# Patient Record
Sex: MALE | Employment: FULL TIME | URBAN - METROPOLITAN AREA
[De-identification: names, ages, dates, MRNs, and addresses within clinical notes are randomized per-mention and may not be internally consistent; named-entity substitution may affect disease eponyms.]

---

## 2018-01-28 ENCOUNTER — RADIANT APPOINTMENT (OUTPATIENT)
Dept: GENERAL RADIOLOGY | Facility: CLINIC | Age: 38
End: 2018-01-28
Attending: FAMILY MEDICINE
Payer: COMMERCIAL

## 2018-01-28 ENCOUNTER — OFFICE VISIT (OUTPATIENT)
Dept: URGENT CARE | Facility: URGENT CARE | Age: 38
End: 2018-01-28
Payer: COMMERCIAL

## 2018-01-28 VITALS
SYSTOLIC BLOOD PRESSURE: 130 MMHG | DIASTOLIC BLOOD PRESSURE: 100 MMHG | HEART RATE: 105 BPM | TEMPERATURE: 98.6 F | OXYGEN SATURATION: 98 %

## 2018-01-28 DIAGNOSIS — S99.911A ANKLE INJURY, RIGHT, INITIAL ENCOUNTER: ICD-10-CM

## 2018-01-28 DIAGNOSIS — S99.911A ANKLE INJURY, RIGHT, INITIAL ENCOUNTER: Primary | ICD-10-CM

## 2018-01-28 PROCEDURE — 99213 OFFICE O/P EST LOW 20 MIN: CPT | Performed by: FAMILY MEDICINE

## 2018-01-28 PROCEDURE — 73630 X-RAY EXAM OF FOOT: CPT | Mod: RT

## 2018-01-28 PROCEDURE — 73610 X-RAY EXAM OF ANKLE: CPT | Mod: RT

## 2018-01-28 RX ORDER — HYDROCODONE BITARTRATE AND ACETAMINOPHEN 5; 325 MG/1; MG/1
1 TABLET ORAL EVERY 6 HOURS PRN
Qty: 10 TABLET | Refills: 0 | Status: SHIPPED | OUTPATIENT
Start: 2018-01-28 | End: 2018-02-22

## 2018-01-28 NOTE — PROGRESS NOTES
SUBJECTIVE:  Kaleb Benites, a 37 year old male scheduled an appointment to discuss the following issues:  Ankle injury, right, initial encounter;     Complaining of right ankle pain while putting his boot on Thursday.  Complaining of some swelling unable to put pressure on the ankle.  When he inverts it or everts that he has some pain      0: He appears well, vital signs are normal. There is swelling and tenderness over the lateral malleolus. No tenderness over the medial aspect of the ankle. The fifth metatarsal is not tender. The ankle joint is intact without excessive opening on stressing. X-Ray shows fracture to be negative. The rest of the foot, ankle and leg exam is normal.    X-ray without evidence of fracture    A: Sprain of ankle    P: Rest and elevate the injured ankle, apply ice .    In addition would use a boot for support that he will wean off of over the next week.    Nonsteroidal medication.  Norco quantity of 10 no refills.

## 2018-01-28 NOTE — NURSING NOTE
"Chief Complaint   Patient presents with     Urgent Care     Trauma     Injury to R ankle putting boot on Thursday night- swollen, unable to put pressure on, unable to turm ankle       Initial BP (!) 130/100 (BP Location: Right arm, Patient Position: Chair, Cuff Size: Adult Large)  Pulse 105  Temp 98.6  F (37  C) (Oral)  SpO2 98% Estimated body mass index is 32.59 kg/(m^2) as calculated from the following:    Height as of 7/1/16: 6' 1\" (1.854 m).    Weight as of 7/1/16: 247 lb (112 kg).  Medication Reconciliation: complete     Carol Puga CMA (AAMA)        "

## 2018-01-28 NOTE — MR AVS SNAPSHOT
"              After Visit Summary   2018    Kaleb Benites    MRN: [id number]           Patient Information     Date Of Birth          1980        Visit Information        Provider Department      2018 4:05 PM Amrit Haley MD [facility]        Today's Diagnoses     Ankle injury, right, initial encounter    -  1      Care Instructions    1. Ice            Follow-ups after your visit        Who to contact     If you have questions or need follow up information about today's clinic visit or your schedule please contact [facility] directly at [phone].  Normal or non-critical lab and imaging results will be communicated to you by [employer], letter or phone within 4 business days after the clinic has received the results. If you do not hear from us within 7 days, please contact the clinic through [employer] or phone. If you have a critical or abnormal lab result, we will notify you by phone as soon as possible.  Submit refill requests through Comviva or call your pharmacy and they will forward the refill request to us. Please allow 3 business days for your refill to be completed.          Additional Information About Your Visit        MyChart Information     Comviva lets you send messages to your doctor, view your test results, renew your prescriptions, schedule appointments and more. To sign up, go to [email or web address] . Click on \"Log in\" on the left side of the screen, which will take you to the Welcome page. Then click on \"Sign up Now\" on the right side of the page.     You will be asked to enter the access code listed below, as well as some personal information. Please follow the directions to create your username and password.     Your access code is: [email or web address]  Expires: 2018  5:23 PM     Your access code will  in 90 days. If you need help or a new code, please call your [facility] clinic or [phone].        Care EveryWhere ID     " This is your Care EveryWhere ID. This could be used by other organizations to access your [place] medical records  [id number]        Your Vitals Were     Pulse Temperature Pulse Oximetry             105 98.6  F (37  C) (Oral) 98%          Blood Pressure from Last 3 Encounters:   01/28/18 (!) 130/100   07/01/16 130/84   04/23/15 110/90    Weight from Last 3 Encounters:   07/01/16 247 lb (112 kg)   04/23/15 247 lb (112 kg)   05/12/14 248 lb (112.5 kg)                 Today's Medication Changes          These changes are accurate as of 1/28/18  5:23 PM.  If you have any questions, ask your nurse or doctor.               Start taking these medicines.        Dose/Directions    HYDROcodone-acetaminophen 5-325 MG per tablet   Commonly known as:  NORCO   Used for:  Ankle injury, right, initial encounter   Started by:  Amrit Haley MD        Dose:  1 tablet   Take 1 tablet by mouth every 6 hours as needed   Quantity:  10 tablet   Refills:  0       nabumetone 750 MG tablet   Commonly known as:  RELAFEN   Used for:  Ankle injury, right, initial encounter   Started by:  Amrit Haley MD        Dose:  750 mg   Take 1 tablet (750 mg) by mouth 2 times daily as needed for moderate pain   Quantity:  30 tablet   Refills:  1       order for DME   Used for:  Ankle injury, right, initial encounter   Started by:  Amrit Haley MD        Equipment being ordered: walking boot   Quantity:  1 Units   Refills:  0            Where to get your medicines      These medications were sent to [id number] [address] [address]     Phone:  [phone]     nabumetone 750 MG tablet         Some of these will need a paper prescription and others can be bought over the counter.  Ask your nurse if you have questions.     Bring a paper prescription for each of these medications     HYDROcodone-acetaminophen 5-325 MG per tablet    order for DME                Primary  Care Provider    Danilo Aguirre MD       No address on file        Equal Access to Services     PARKER MARSHALL : Hadii aad ku aminah Soosmany, waaxda luqadaha, qaybta kawindy eduardlelandchloé, waxsydnee cristo giorgichinedu bonilladorivamsi meredithnasrachinedu rivers. So [facility] [phone].    ATENCIÓN: Si habla español, tiene a cuellar disposición servicios gratuitos de asistencia lingüística. [facility] [phone].    We comply with applicable federal civil rights laws and [place] laws. We do not discriminate on the basis of race, color, national origin, age, disability, sex, sexual orientation, or gender identity.            Thank you!     Thank you for choosing [facility]  for your care. Our goal is always to provide you with excellent care. Hearing back from our patients is one way we can continue to improve our services. Please take a few minutes to complete the written survey that you may receive in the mail after your visit with us. Thank you!             Your Updated Medication List - Protect others around you: Learn how to safely use, store and throw away your medicines at www.disposemymeds.org.          This list is accurate as of 1/28/18  5:23 PM.  Always use your most recent med list.                   Brand Name Dispense Instructions for use Diagnosis    cyclobenzaprine 5 MG tablet    FLEXERIL    30 tablet    Take 1-2 tablets (5-10 mg) by mouth 3 times daily as needed for muscle spasms    Back muscle spasm       diclofenac 50 MG EC tablet    VOLTAREN    30 tablet    Take 1 tablet (50 mg) by mouth 3 times daily With food    Acute right-sided low back pain without sciatica, Back muscle spasm       HYDROcodone-acetaminophen 5-325 MG per tablet    NORCO    10 tablet    Take 1 tablet by mouth every 6 hours as needed    Ankle injury, right, initial encounter       nabumetone 750 MG tablet    RELAFEN    30 tablet    Take 1 tablet (750 mg) by mouth 2 times daily as needed for moderate pain    Ankle injury, right, initial  encounter       order for DME     1 Units    Equipment being ordered: walking boot    Ankle injury, right, initial encounter

## 2018-02-22 ENCOUNTER — OFFICE VISIT (OUTPATIENT)
Dept: INTERNAL MEDICINE | Facility: CLINIC | Age: 38
End: 2018-02-22
Payer: COMMERCIAL

## 2018-02-22 VITALS
OXYGEN SATURATION: 97 % | TEMPERATURE: 98.2 F | HEART RATE: 87 BPM | BODY MASS INDEX: 34.33 KG/M2 | HEIGHT: 73 IN | SYSTOLIC BLOOD PRESSURE: 130 MMHG | DIASTOLIC BLOOD PRESSURE: 78 MMHG | WEIGHT: 259 LBS

## 2018-02-22 DIAGNOSIS — S93.401D SPRAIN OF RIGHT ANKLE, UNSPECIFIED LIGAMENT, SUBSEQUENT ENCOUNTER: ICD-10-CM

## 2018-02-22 DIAGNOSIS — M25.571 ACUTE RIGHT ANKLE PAIN: Primary | ICD-10-CM

## 2018-02-22 PROCEDURE — 36415 COLL VENOUS BLD VENIPUNCTURE: CPT | Performed by: FAMILY MEDICINE

## 2018-02-22 PROCEDURE — 84550 ASSAY OF BLOOD/URIC ACID: CPT | Performed by: FAMILY MEDICINE

## 2018-02-22 PROCEDURE — 99213 OFFICE O/P EST LOW 20 MIN: CPT | Performed by: FAMILY MEDICINE

## 2018-02-22 RX ORDER — INDOMETHACIN 50 MG/1
50 CAPSULE ORAL
Qty: 30 CAPSULE | Refills: 1 | Status: SHIPPED | OUTPATIENT
Start: 2018-02-22 | End: 2022-03-15

## 2018-02-22 NOTE — MR AVS SNAPSHOT
After Visit Summary   2/22/2018    Kaleb Benites    MRN: [id number]           Patient Information     Date Of Birth          1980        Visit Information        Provider Department      2/22/2018 8:40 AM Ander Nieves MD [facility]        Today's Diagnoses     Acute right ankle pain    -  1      Care Instructions      Take prescribed medication as directed.    Brace.    Light activity.          Follow-ups after your visit        Additional Services     ORTHOPEDICS ADULT REFERRAL       Your provider has referred you to: [facility]: [facility]  [phone]   [email or web address] [email or web address]    Please be aware that coverage of these services is subject to the terms and limitations of your health insurance plan.  Call member services at your health plan with any benefit or coverage questions.      Please bring the following to your appointment:    >>   Any x-rays, CTs or MRIs which have been performed.  Contact the facility where they were done to arrange for  prior to your scheduled appointment.    >>   List of current medications   >>   This referral request   >>   Any documents/labs given to you for this referral                  Who to contact     If you have questions or need follow up information about today's clinic visit or your schedule please contact [facility] directly at [phone].  Normal or non-critical lab and imaging results will be communicated to you by MyChart, letter or phone within 4 business days after the clinic has received the results. If you do not hear from us within 7 days, please contact the clinic through MyChart or phone. If you have a critical or abnormal lab result, we will notify you by phone as soon as possible.  Submit refill requests through YAMAP or call your pharmacy and they will forward the  "refill request to us. Please allow 3 business days for your refill to be completed.          Additional Information About Your Visit        QuickGiftsharWego Information     iStoryTime lets you send messages to your doctor, view your test results, renew your prescriptions, schedule appointments and more. To sign up, go to [email or web address] . Click on \"Log in\" on the left side of the screen, which will take you to the Welcome page. Then click on \"Sign up Now\" on the right side of the page.     You will be asked to enter the access code listed below, as well as some personal information. Please follow the directions to create your username and password.     Your access code is: [email or web address]  Expires: 2018  5:23 PM     Your access code will  in 90 days. If you need help or a new code, please call your [facility] clinic or [phone].        Care EveryWhere ID     This is your Care EveryWhere ID. This could be used by other organizations to access your [place] medical records  [id number]        Your Vitals Were     Pulse Temperature Height Pulse Oximetry BMI (Body Mass Index)       87 98.2  F (36.8  C) (Oral) 6' 1\" (1.854 m) 97% 34.17 kg/m2        Blood Pressure from Last 3 Encounters:   18 130/78   18 (!) 130/100   16 130/84    Weight from Last 3 Encounters:   18 259 lb (117.5 kg)   16 247 lb (112 kg)   04/23/15 247 lb (112 kg)              We Performed the Following     ORTHOPEDICS ADULT REFERRAL     Uric acid          Today's Medication Changes          These changes are accurate as of 18  9:22 AM.  If you have any questions, ask your nurse or doctor.               Start taking these medicines.        Dose/Directions    indomethacin 50 MG capsule   Commonly known as:  INDOCIN   Used for:  Acute right ankle pain   Started by:  Ander Nieves MD        Dose:  50 mg   Take 1 capsule (50 mg) by mouth 3 times daily (with meals) Take with food.   Quantity:  30 capsule "   Refills:  1            Where to get your medicines      These medications were sent to [id number] - [facility] [address] [place]  [address] [facility] [address]     Phone:  [phone]     indomethacin 50 MG capsule                Primary Care Provider    None Specified       No primary provider on file.        Equal Access to Services     [facility] MARSHALL : Hadii aad ku hadasho Soomaali, waaxda luqadaha, qaybta kaalmada adeegyada, waxay cristo bonilladorivamsi rivers. So [facility] [phone].    ATENCIÓN: Si habla español, tiene a cuellar disposición servicios gratuitos de asistencia lingüística. Gunnar al [phone].    We comply with applicable federal civil rights laws and [place] laws. We do not discriminate on the basis of race, color, national origin, age, disability, sex, sexual orientation, or gender identity.            Thank you!     Thank you for choosing [facility]  for your care. Our goal is always to provide you with excellent care. Hearing back from our patients is one way we can continue to improve our services. Please take a few minutes to complete the written survey that you may receive in the mail after your visit with us. Thank you!             Your Updated Medication List - Protect others around you: Learn how to safely use, store and throw away your medicines at www.disposemymeds.org.          This list is accurate as of 2/22/18  9:22 AM.  Always use your most recent med list.                   Brand Name Dispense Instructions for use Diagnosis    indomethacin 50 MG capsule    INDOCIN    30 capsule    Take 1 capsule (50 mg) by mouth 3 times daily (with meals) Take with food.    Acute right ankle pain       order for DME     1 Units    Equipment being ordered: walking boot    Ankle injury, right, initial encounter

## 2018-02-22 NOTE — PROGRESS NOTES
"CHIEF COMPLAINT    R ankle pain      HISTORY    He twisted R ankle about 4 weeks ago. Twisted while he was putting on a boot. He had pain and swelling. He went to  and x rays neg. He was given a boot. Boot seemed to help but bothered his gait and back.    He still c/o pain with activity and swelling.    He is concerned because he has had to be at  about 4 times in last 2 yr due to R ankle injuries. He wonders about why these recur.    Occupation - desk work.    Pos FH of gout in [place], Mother, GF.    Patient Active Problem List   Diagnosis     Health Care Home     Living will, counseling/discussion     Current Outpatient Prescriptions   Medication Sig Dispense Refill     indomethacin (INDOCIN) 50 MG capsule Take 1 capsule (50 mg) by mouth 3 times daily (with meals) Take with food. 30 capsule 1     order for DME Equipment being ordered: walking boot 1 Units 0       REVIEW OF SYSTEMS    Unremarkable except as above.      SOCIAL HISTORY    Desk work - property management.      No past medical history on file.      EXAM  /78  Pulse 87  Temp 98.2  F (36.8  C) (Oral)  Ht 6' 1\" (1.854 m)  Wt 259 lb (117.5 kg)  SpO2 97%  BMI 34.17 kg/m2    R leg:  No edema  Moderate ankle swelling.  No significant tenderness medially or laterally.    X rays reviewed - no FX.      (M25.571) Acute right ankle pain  (primary encounter diagnosis)  Comment:   Recurrent injuries per HX.  R/O gout  Consult.  Plan: Uric acid, ORTHOPEDICS ADULT REFERRAL,         indomethacin (INDOCIN) 50 MG capsule            (S93.401D) Sprain of right ankle, unspecified ligament, subsequent encounter  Comment:   Plan: ORTHOPEDICS ADULT REFERRAL, indomethacin         (INDOCIN) 50 MG capsule                Take prescribed medication as directed.    Brace.    Light activity.                "

## 2018-02-24 LAB — URATE SERPL-MCNC: 8.5 MG/DL (ref 3.5–7.2)

## 2018-02-26 ENCOUNTER — THERAPY VISIT (OUTPATIENT)
Dept: PHYSICAL THERAPY | Facility: CLINIC | Age: 38
End: 2018-02-26
Payer: COMMERCIAL

## 2018-02-26 DIAGNOSIS — M25.571 PAIN IN JOINT INVOLVING ANKLE AND FOOT, RIGHT: ICD-10-CM

## 2018-02-26 DIAGNOSIS — S93.491A HIGH ANKLE SPRAIN OF RIGHT LOWER EXTREMITY, INITIAL ENCOUNTER: Primary | ICD-10-CM

## 2018-02-26 PROCEDURE — 97110 THERAPEUTIC EXERCISES: CPT | Mod: GP | Performed by: PHYSICAL THERAPIST

## 2018-02-26 PROCEDURE — 97161 PT EVAL LOW COMPLEX 20 MIN: CPT | Mod: GP | Performed by: PHYSICAL THERAPIST

## 2018-02-26 NOTE — PROGRESS NOTES
[facility] Initial Evaluation  Subjective:  Patient is a 37 year old male presenting with rehab right ankle/foot hpi.   Kaleb Benites is a 37 year old male with a right ankle condition.  Condition occurred with:  A fall/slip.  Condition occurred: in the community.  This is a new condition  1-25-18 pt sprained right ankle when he tripped over a boot. Pt seen in Urgent Care on 1-28-18. Pt issued a boot but DC'd secondary to left hip and knee pain. Pt seen by MD on 2-22-18 and referred to physical therapy..    Patient reports pain:  Anterior, posterior, medial and lateral (heel).  Radiates to:  Lower leg.  Pain is described as aching and is intermittent and reported as 8/10.  Associated symptoms:  Loss of motion/stiffness and loss of strength. Pain is worse in the A.M..  Symptoms are exacerbated by ascending stairs, descending stairs, standing, walking and weight bearing and relieved by rest, ice and NSAID's.  Since onset symptoms are unchanged.  Special tests:  X-ray (see report).  Previous treatment: hstory of 3 prior ankle sprains which resolved on their own.    General health as reported by patient is good.  Pertinent medical history includes:  None.  Medical allergies: no.  Other surgeries include:  None reported.  Current medications:  Anti-inflammatory.  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  Patient is working in normal job without restrictions.  Primary job tasks include:  Prolonged sitting.    Barriers include:  None as reported by patient.    Red flags:  None as reported by patient.                        Objective:    Gait:  Decreased functional dorsiflexion        Flexibility/Screens:       Lower Extremity:      Decreased right lower extremity flexibility:  Gastroc and Soleus          Ankle/Foot Evaluation  ROM:    AROM:    Dorsiflexion: Left:    Right:   -4  Plantarflexion: Left:     Right:  35          PROM:    Dorsiflexion: Left:        Right:   6   Plantarflexion: Left:         Right:  41              Strength:    Dorsiflexion:  Right: 4-/5   Pain:  Plantarflexion: Right: 4/5  Pain:  Inversion:Right: 4/5  Pain:  Eversion:Right: 4-/5  Pain:                  LIGAMENT TESTING:   Anterior Drawer (ATF) Right: Gr II              PALPATION:     Right ankle tenderness present at:   anterior tibialis; deltoid ligament; anterior talofibular ligament and posterior talofibular ligament  EDEMA: Edema ankle: moderate edema right knee.          MOBILITY TESTING:     Tib-Fib Distal Right: hypermobile                                                            General     ROS    Assessment/Plan:    Patient is a 37 year old male with right side ankle complaints.    Patient has the following significant findings with corresponding treatment plan.                Diagnosis 1:  Right high ankle sprain  Pain -  hot/cold therapy, manual therapy, self management, education and home program  Decreased ROM/flexibility - manual therapy, therapeutic exercise, therapeutic activity and home program  Decreased strength - therapeutic exercise, therapeutic activities and home program    Therapy Evaluation Codes:   1) History comprised of:   Personal factors that impact the plan of care:      Past/current experiences.    Comorbidity factors that impact the plan of care are:      Weakness.     Medications impacting care: None.  2) Examination of Body Systems comprised of:   Body structures and functions that impact the plan of care:      Ankle and Head.   Activity limitations that impact the plan of care are:      Jumping, Sports, Squatting/kneeling, Stairs, Standing and Walking.  3) Clinical presentation characteristics are:   Stable/Uncomplicated.  4) Decision-Making    Low complexity using standardized patient assessment instrument and/or measureable assessment of functional outcome.  Cumulative Therapy Evaluation is: Low complexity.    Previous and current functional limitations:  (See Goal Flow Sheet for this  information)    Short term and Long term goals: (See Goal Flow Sheet for this information)     Communication ability:  Patient appears to be able to clearly communicate and understand verbal and written communication and follow directions correctly.  Treatment Explanation - The following has been discussed with the patient:   RX ordered/plan of care  Anticipated outcomes  Possible risks and side effects  This patient would benefit from PT intervention to resume normal activities.   Rehab potential is good.    Frequency:  1 X week, once daily  Duration:  for 6 weeks  Discharge Plan:  Achieve all LTG.  Independent in home treatment program.  Reach maximal therapeutic benefit.    Please refer to the daily flowsheet for treatment today, total treatment time and time spent performing 1:1 timed codes.

## 2018-03-05 ENCOUNTER — THERAPY VISIT (OUTPATIENT)
Dept: PHYSICAL THERAPY | Facility: CLINIC | Age: 38
End: 2018-03-05
Payer: COMMERCIAL

## 2018-03-05 DIAGNOSIS — M25.571 PAIN IN JOINT INVOLVING ANKLE AND FOOT, RIGHT: ICD-10-CM

## 2018-03-05 DIAGNOSIS — S93.491A HIGH ANKLE SPRAIN OF RIGHT LOWER EXTREMITY, INITIAL ENCOUNTER: ICD-10-CM

## 2018-03-05 PROCEDURE — 97110 THERAPEUTIC EXERCISES: CPT | Mod: GP | Performed by: PHYSICAL THERAPIST

## 2018-03-05 PROCEDURE — 97530 THERAPEUTIC ACTIVITIES: CPT | Mod: GP | Performed by: PHYSICAL THERAPIST

## 2018-03-05 NOTE — PROGRESS NOTES
Subjective:  HPI                    Objective:  System    Physical Exam    General     ROS    Assessment/Plan:    SUBJECTIVE  Subjective changes as noted by pt:  Pt notes decreased pain and increased mobility     Current pain level: 1/10     Changes in function:  Pt still notes pain with pressure on the arch of the foot with ambulation     Adverse reaction to treatment or activity:  None    OBJECTIVE  Changes in objective findings:  AROM DF 6 PROM DF 15        ASSESSMENT  Kaleb continues to require intervention to meet STG and LTG's: PT  Patient's symptoms are resolving.  Response to therapy has shown an improvement in  pain level and ROM   Progress made towards STG/LTG?  Yes,     PLAN  Current treatment program is being advanced to more complex exercises.    PTA/ATC plan:  N/A    Please refer to the daily flowsheet for treatment today, total treatment time and time spent performing 1:1 timed codes.

## 2018-03-12 ENCOUNTER — THERAPY VISIT (OUTPATIENT)
Dept: PHYSICAL THERAPY | Facility: CLINIC | Age: 38
End: 2018-03-12
Payer: COMMERCIAL

## 2018-03-12 DIAGNOSIS — S93.491A HIGH ANKLE SPRAIN OF RIGHT LOWER EXTREMITY, INITIAL ENCOUNTER: ICD-10-CM

## 2018-03-12 DIAGNOSIS — M25.571 PAIN IN JOINT INVOLVING ANKLE AND FOOT, RIGHT: ICD-10-CM

## 2018-03-12 PROCEDURE — 97530 THERAPEUTIC ACTIVITIES: CPT | Mod: GP | Performed by: PHYSICAL THERAPIST

## 2018-03-12 PROCEDURE — 97110 THERAPEUTIC EXERCISES: CPT | Mod: GP | Performed by: PHYSICAL THERAPIST

## 2018-03-12 NOTE — PROGRESS NOTES
Subjective:  HPI                    Objective:  System    Physical Exam    General     ROS    Assessment/Plan:    SUBJECTIVE  Subjective changes as noted by pt:  Pt notes increased soreness past week. Pt can't recall any reason for increased pain     Current pain level: 3/10     Changes in function:  Pt doing more stairs. Pt notes more difficulty descending stairs rather than ascending     Adverse reaction to treatment or activity:  None    OBJECTIVE  Changes in objective findings:  Pt unable to perform single leg toe raise. Balance improving        ASSESSMENT  Kaleb continues to require intervention to meet STG and LTG's: PT  Patient's symptoms are resolving.  Response to therapy has shown an improvement in  proprioception and function  Progress made towards STG/LTG?  Yes,     PLAN  Current treatment program is being advanced to more complex exercises.    PTA/ATC plan:  N/A    Please refer to the daily flowsheet for treatment today, total treatment time and time spent performing 1:1 timed codes.

## 2018-03-19 ENCOUNTER — THERAPY VISIT (OUTPATIENT)
Dept: PHYSICAL THERAPY | Facility: CLINIC | Age: 38
End: 2018-03-19
Payer: COMMERCIAL

## 2018-03-19 DIAGNOSIS — S93.491A HIGH ANKLE SPRAIN OF RIGHT LOWER EXTREMITY, INITIAL ENCOUNTER: ICD-10-CM

## 2018-03-19 DIAGNOSIS — M25.571 PAIN IN JOINT INVOLVING ANKLE AND FOOT, RIGHT: ICD-10-CM

## 2018-03-19 PROCEDURE — 97110 THERAPEUTIC EXERCISES: CPT | Mod: GP | Performed by: PHYSICAL THERAPIST

## 2018-03-19 PROCEDURE — 97530 THERAPEUTIC ACTIVITIES: CPT | Mod: GP | Performed by: PHYSICAL THERAPIST

## 2018-03-19 NOTE — PROGRESS NOTES
Subjective:  HPI                    Objective:  System    Physical Exam    General     ROS    Assessment/Plan:    SUBJECTIVE  Subjective changes as noted by pt:  Pt notes decreased pain.      Current pain level: 2/10     Changes in function:  Pt still notes some difficulty with pivoting and walking on uneven surfaces     Adverse reaction to treatment or activity:  None    OBJECTIVE  Changes in objective findings: right ankle DF AROM 10 PROM 14      ASSESSMENT  Kaleb continues to require intervention to meet STG and LTG's: PT  Patient's symptoms are resolving.  Response to therapy has shown an improvement in  pain level  Progress made towards STG/LTG?  Yes,     PLAN  Current treatment program is being advanced to more complex exercises.    PTA/ATC plan:  N/A    Please refer to the daily flowsheet for treatment today, total treatment time and time spent performing 1:1 timed codes.

## 2018-03-26 ENCOUNTER — THERAPY VISIT (OUTPATIENT)
Dept: PHYSICAL THERAPY | Facility: CLINIC | Age: 38
End: 2018-03-26
Payer: COMMERCIAL

## 2018-03-26 DIAGNOSIS — S93.491A HIGH ANKLE SPRAIN OF RIGHT LOWER EXTREMITY, INITIAL ENCOUNTER: ICD-10-CM

## 2018-03-26 DIAGNOSIS — M25.571 PAIN IN JOINT INVOLVING ANKLE AND FOOT, RIGHT: ICD-10-CM

## 2018-03-26 PROCEDURE — 97110 THERAPEUTIC EXERCISES: CPT | Mod: GP | Performed by: PHYSICAL THERAPIST

## 2018-03-26 PROCEDURE — 97530 THERAPEUTIC ACTIVITIES: CPT | Mod: GP | Performed by: PHYSICAL THERAPIST

## 2018-03-26 NOTE — PROGRESS NOTES
Subjective:  HPI                    Objective:  System    Physical Exam    General     ROS    Assessment/Plan:    SUBJECTIVE  Subjective changes as noted by pt:  Pt notes continued improvement. Pt notes intermittent pain at this time. Pt notes some weakness with certain movements.      Current pain level: 1/10     Changes in function:  Pt notes increased ease with stairs     Adverse reaction to treatment or activity:  None    OBJECTIVE  Changes in objective findings:  Pt notes some difficulty with supported hopping activities. Pt does well with carioca drill and lateral movements          ASSESSMENT  Kaleb continues to require intervention to meet STG and LTG's: PT  Patient's symptoms are resolving.  Response to therapy has shown an improvement in  function  Progress made towards STG/LTG?  Yes,     PLAN  Current treatment program is being advanced to more complex exercises.    PTA/ATC plan:  N/A    Please refer to the daily flowsheet for treatment today, total treatment time and time spent performing 1:1 timed codes.

## 2018-04-09 ENCOUNTER — THERAPY VISIT (OUTPATIENT)
Dept: PHYSICAL THERAPY | Facility: CLINIC | Age: 38
End: 2018-04-09
Payer: COMMERCIAL

## 2018-04-09 DIAGNOSIS — S93.491A HIGH ANKLE SPRAIN OF RIGHT LOWER EXTREMITY, INITIAL ENCOUNTER: ICD-10-CM

## 2018-04-09 DIAGNOSIS — M25.571 PAIN IN JOINT INVOLVING ANKLE AND FOOT, RIGHT: ICD-10-CM

## 2018-04-09 PROCEDURE — 97530 THERAPEUTIC ACTIVITIES: CPT | Mod: GP | Performed by: PHYSICAL THERAPIST

## 2018-04-09 PROCEDURE — 97110 THERAPEUTIC EXERCISES: CPT | Mod: GP | Performed by: PHYSICAL THERAPIST

## 2018-04-09 NOTE — PROGRESS NOTES
Subjective:  HPI                    Objective:  System    Ankle/Foot Evaluation  ROM:    AROM:    Dorsiflexion: Left:    Right:   10  Plantarflexion: Left:     Right:  60          PROM:    Dorsiflexion: Left:        Right:   16   Plantarflexion: Left:        Right:  63                                                                            General     ROS    Assessment/Plan:    DISCHARGE REPORT    Progress reporting period is from 2-26-18 to 4-9-18.       SUBJECTIVE  Subjective changes noted by patient:  Pt reports ankle is feeling better. Pt notes decreased pain and increased mobility. Pt still notes some GS weakness. .       Current pain level is 1/10  .     Previous pain level was  8/10  .   Changes in function:  Pt is doing well with everyday ADL's. Pt still notes some difficulty with pushing off activities.   Adverse reaction to treatment or activity: None    OBJECTIVE  Changes noted in objective findings:  Pt ambulating without deviation. Pt denies point tenderness with palpation to the right ankle. Anterior drawer sign is roberta        ASSESSMENT/PLAN  Updated problem list and treatment plan: Diagnosis 1:  Right high ankle sprain  Pain -  hot/cold therapy, self management, education and home program  Decreased ROM/flexibility - therapeutic exercise, therapeutic activity and home program  Decreased strength - therapeutic exercise, therapeutic activities and home program  STG/LTGs have been met or progress has been made towards goals:  Yes,   Assessment of Progress: The patient's condition is improving.  Self Management Plans:  Patient has been instructed in a home treatment program.  I have re-evaluated this patient and find that the nature, scope, duration and intensity of the therapy is appropriate for the medical condition of the patient.  Alvin continues to require the following intervention to meet STG and LTG's:  PT intervention is no longer required to meet STG/LTG.    Recommendations:  This  patient is ready to be discharged from therapy and continue their home treatment program.    Please refer to the daily flowsheet for treatment today, total treatment time and time spent performing 1:1 timed codes.

## 2019-10-17 ENCOUNTER — OFFICE VISIT (OUTPATIENT)
Dept: URGENT CARE | Facility: URGENT CARE | Age: 39
End: 2019-10-17
Payer: COMMERCIAL

## 2019-10-17 VITALS
DIASTOLIC BLOOD PRESSURE: 84 MMHG | TEMPERATURE: 98.4 F | HEART RATE: 86 BPM | SYSTOLIC BLOOD PRESSURE: 126 MMHG | BODY MASS INDEX: 31.66 KG/M2 | RESPIRATION RATE: 16 BRPM | WEIGHT: 240 LBS

## 2019-10-17 DIAGNOSIS — M25.461 PAIN AND SWELLING OF KNEE, RIGHT: Primary | ICD-10-CM

## 2019-10-17 DIAGNOSIS — M25.561 PAIN AND SWELLING OF KNEE, RIGHT: Primary | ICD-10-CM

## 2019-10-17 PROCEDURE — 99214 OFFICE O/P EST MOD 30 MIN: CPT | Performed by: PHYSICIAN ASSISTANT

## 2019-10-17 RX ORDER — IBUPROFEN 800 MG/1
800 TABLET, FILM COATED ORAL EVERY 8 HOURS PRN
Qty: 30 TABLET | Refills: 0 | Status: SHIPPED | OUTPATIENT
Start: 2019-10-17 | End: 2022-03-15

## 2019-10-17 RX ORDER — HYDROCODONE BITARTRATE AND ACETAMINOPHEN 5; 325 MG/1; MG/1
1 TABLET ORAL EVERY 8 HOURS PRN
Qty: 15 TABLET | Refills: 0 | Status: SHIPPED | OUTPATIENT
Start: 2019-10-17 | End: 2019-10-22

## 2019-10-17 ASSESSMENT — ENCOUNTER SYMPTOMS
CHILLS: 0
FEVER: 0

## 2019-10-17 NOTE — PROGRESS NOTES
SUBJECTIVE:   Kaleb Benites is a 39 year old male presenting with a chief complaint of   Chief Complaint   Patient presents with     Urgent Care     Knee Pain     Twisted Right knee this weekend, has had issues in the past, woke up in the middle of the night with severe pain, alos hot to the touch and swollen        He is an established patient of [facility].    MS Injury/Pain    Onset of symptoms was 4 day(s) ago.  Location: right knee  Context:      Does not recall any specific injury.  But reports he was working in his garage 4 days ago, was going up and down the ladder. Not sure if he perhaps twisted the right knee. The right knee started swelling the next day and swelling has been persistent and it is getting worse.   Severity moderately severe  Current and Associated symptoms: Pain and Swelling  Denies  Bruising, Warmth and Redness  Aggravating Factors: walking and weight-bearing  Therapies to improve symptoms include: ice and ibuprofen  This is not the first time this type of problem has occurred for this patient.   He notes he has had issues with the right knee in the past before.  Had some cartilage problems.  Typically will resolve after a couple days.  Denies any fevers or chills      Review of Systems   Constitutional: Negative for chills and fever.   Musculoskeletal:        Right knee pain       History reviewed. No pertinent past medical history.  Family History   Problem Relation Age of Onset     Arthritis Mother         djd knee      Current Outpatient Medications   Medication Sig Dispense Refill     HYDROcodone-acetaminophen (NORCO) 5-325 MG tablet Take 1 tablet by mouth every 8 hours as needed for moderate to severe pain 15 tablet 0     ibuprofen (ADVIL/MOTRIN) 800 MG tablet Take 1 tablet (800 mg) by mouth every 8 hours as needed for moderate pain 30 tablet 0     indomethacin (INDOCIN) 50 MG capsule Take 1 capsule (50 mg) by mouth 3 times daily (with meals) Take with food. 30 capsule 1      order for DME Equipment being ordered: walking boot 1 Units 0     Social History     Tobacco Use     Smoking status: Former Smoker     Smokeless tobacco: Never Used   Substance Use Topics     Alcohol use: Yes       OBJECTIVE  /84   Pulse 86   Temp 98.4  F (36.9  C) (Oral)   Resp 16   Wt 108.9 kg (240 lb)   BMI 31.66 kg/m      Physical Exam  Vitals signs and nursing note reviewed.   Constitutional:       General: He is not in acute distress.     Appearance: He is well-developed.   HENT:      Head: Normocephalic and atraumatic.   Eyes:      Conjunctiva/sclera: Conjunctivae normal.   Neck:      Musculoskeletal: Normal range of motion.   Pulmonary:      Effort: Pulmonary effort is normal. No respiratory distress.   Musculoskeletal:         General: Swelling and tenderness present. No deformity.      Comments: Right knee exam: There is moderate swelling noted.  No ecchymosis or erythema noted.  Tenderness to palpation over the lateral aspect of the knee.  Flexion and extension are limited due to pain.  He is walking with a limp due to the pain.   Skin:     General: Skin is warm and dry.   Neurological:      Mental Status: He is alert.         Labs:  No results found for this or any previous visit (from the past 24 hour(s)).    X-Ray was not done.    ASSESSMENT:      ICD-10-CM    1. Pain and swelling of knee, right M25.561 ORTHO  REFERRAL    M25.461 ibuprofen (ADVIL/MOTRIN) 800 MG tablet     HYDROcodone-acetaminophen (NORCO) 5-325 MG tablet          PLAN:    Right knee pain and swelling: Significant swelling noted on exam today.  Ibuprofen 800 mg recommended to help with the swelling.  Rest.  Elevation.  Icing.  Norco for moderate to severe pain at night.  Orthopedic referral.  Follow-up sooner if any worsening symptoms.  Patient agrees with the plan.    Followup:    If not improving or if condition worsens, follow up with your Primary Care Provider

## 2019-10-21 ENCOUNTER — OFFICE VISIT (OUTPATIENT)
Dept: ORTHOPEDICS | Facility: CLINIC | Age: 39
End: 2019-10-21
Payer: COMMERCIAL

## 2019-10-21 VITALS
BODY MASS INDEX: 31.81 KG/M2 | HEIGHT: 73 IN | WEIGHT: 240 LBS | SYSTOLIC BLOOD PRESSURE: 162 MMHG | DIASTOLIC BLOOD PRESSURE: 100 MMHG

## 2019-10-21 DIAGNOSIS — M25.561 ACUTE PAIN OF RIGHT KNEE: Primary | ICD-10-CM

## 2019-10-21 PROCEDURE — 99204 OFFICE O/P NEW MOD 45 MIN: CPT | Performed by: FAMILY MEDICINE

## 2019-10-21 ASSESSMENT — MIFFLIN-ST. JEOR: SCORE: 2052.51

## 2019-10-21 NOTE — LETTER
10/21/2019         RE: Kaleb Benites  [address] [facility] [address]        Dear Colleague,    Thank you for referring your patient, Kaleb Benites, to the  SPORTS MEDICINE. Please see a copy of my visit note below.    [facility]   Clinic Visit s Oct 21, 2019    PCP: No Ref-Primary, Physician      Kaleb is a 39 year old male who is seen as self referral for   Chief Complaint   Patient presents with     Right Knee - Pain       Injury: Patient describes injury as was doing housework last weekend and noticed pain the next day.          Location of Pain: right knee anterior , nonradiating   Duration of Pain: acute, 5 day(s),   Rating of Pain at worst: 8/10  Rating of Pain Currently: 5/10  Pain is better with: activity avoidance   Pain is worse with: standing and walking   Treatment so far consists of: compression, ice, rest and ibuprofen  Associated symptoms: swelling Mild  Recent imaging completed: No recent imaging completed.  Prior History of related problems: football injuries in high school, had a flare up in his 20's     Social History: is employed as a/an office job        Patient Active Problem List    Diagnosis Date Noted     Health Care Home 04/02/2014     Priority: Medium     Living will, counseling/discussion 04/02/2014     Priority: Medium     Advance Care Planning:   ACP Review and Resources Provided:  Reviewed chart for advance care plan.  Kaleb Benites has no plan or code status on file. Discussed available resources and provided with information. Confirmed code status reflects current choices pending further ACP discussions.  Confirmed/documented designated decision maker(s). See permanent comments section of demographics in clinical tab.   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   All other systems reviewed and are negative.        Physical Exam  Musculoskeletal:      Right knee: He exhibits effusion.       BP (!) 162/100   Ht 1.854 m (6' 1\")   Wt 108.9 kg (240 lb)   BMI 31.66 kg/m     Constitutional:well-developed, well-nourished, and in no distress.   Cardiovascular: Intact distal pulses.    Neurological: alert. Gait Abnormal:   Antalgic gait  Skin: Skin is warm and dry.   Psychiatric: Mood and affect normal.   Respiratory: unlabored, speaks in full sentences  Lymph: no LAD, no lymphangitis            Right Knee Exam     Tenderness   The patient is experiencing tenderness in the medial joint line.    Range of Motion   Extension: normal   Flexion:  120 abnormal     Tests   Catrachito:  Medial - positive Lateral - negative  Varus: negative Valgus: negative  Lachman:  Anterior - negative      Drawer:  Anterior - negative    Posterior - negative  Patellar apprehension: negative    Other   Erythema: absent  Sensation: normal  Pulse: present  Swelling: moderate  Effusion: effusion present            ASSESSMENT/PLAN    ICD-10-CM    1. Acute pain of right knee M25.561 MR Knee Right w/o Contrast     Acute knee pain with prominent effusion.  Meniscus " tear suspected.  MRI ordered.  Will notify via [employer].      Again, thank you for allowing me to participate in the care of your patient.        Sincerely,        Brady Odell MD

## 2019-10-21 NOTE — PROGRESS NOTES
[facility]   Clinic Visit s Oct 21, 2019    PCP: No Ref-Primary, Physician      Kaleb is a 39 year old male who is seen as self referral for   Chief Complaint   Patient presents with     Right Knee - Pain       Injury: Patient describes injury as was doing housework last weekend and noticed pain the next day.          Location of Pain: right knee anterior , nonradiating   Duration of Pain: acute, 5 day(s),   Rating of Pain at worst: 8/10  Rating of Pain Currently: 5/10  Pain is better with: activity avoidance   Pain is worse with: standing and walking   Treatment so far consists of: compression, ice, rest and ibuprofen  Associated symptoms: swelling Mild  Recent imaging completed: No recent imaging completed.  Prior History of related problems: football injuries in high school, had a flare up in his 20's     Social History: is employed as a/an office job        Patient Active Problem List    Diagnosis Date Noted     Health Care Home 04/02/2014     Priority: Medium     Living will, counseling/discussion 04/02/2014     Priority: Medium     Advance Care Planning:   ACP Review and Resources Provided:  Reviewed chart for advance care plan.  Kaleb Benites has no plan or code status on file. Discussed available resources and provided with information. Confirmed code status reflects current choices pending further ACP discussions.  Confirmed/documented designated decision maker(s). See permanent comments section of demographics in clinical tab.   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   All other systems reviewed and are negative.        Physical Exam  Musculoskeletal:      Right knee: He exhibits effusion.       BP (!) 162/100   Ht 1.854 m (6' 1\")   Wt 108.9 kg (240 lb)   BMI 31.66 kg/m    Constitutional:well-developed, well-nourished, and in no distress.   Cardiovascular: Intact distal pulses.    Neurological: alert. Gait Abnormal:   Antalgic gait  Skin: Skin is warm and dry.   Psychiatric: Mood and affect normal.   Respiratory: unlabored, speaks in full sentences  Lymph: no LAD, no lymphangitis            Right Knee Exam     Tenderness   The patient is experiencing tenderness in the medial joint line.    Range of Motion   Extension: normal   Flexion:  120 abnormal     Tests   Catrachito:  Medial - positive Lateral - negative  Varus: negative Valgus: negative  Lachman:  Anterior - negative      Drawer:  Anterior - negative    Posterior - negative  Patellar apprehension: negative    Other   Erythema: absent  Sensation: normal  Pulse: present  Swelling: moderate  Effusion: effusion present            ASSESSMENT/PLAN    ICD-10-CM    1. Acute pain of right knee M25.561 MR Knee Right w/o Contrast     Acute knee pain with prominent effusion.  Meniscus tear suspected.  MRI ordered.  Will notify via [employer] [facility].  "

## 2019-10-22 ENCOUNTER — HOSPITAL ENCOUNTER (OUTPATIENT)
Dept: MRI IMAGING | Facility: CLINIC | Age: 39
Discharge: HOME | End: 2019-10-22
Attending: FAMILY MEDICINE | Admitting: FAMILY MEDICINE
Payer: COMMERCIAL

## 2019-10-22 DIAGNOSIS — M25.561 ACUTE PAIN OF RIGHT KNEE: ICD-10-CM

## 2019-10-22 PROCEDURE — 73721 MRI JNT OF LWR EXTRE W/O DYE: CPT | Mod: RT

## 2019-10-28 ENCOUNTER — OFFICE VISIT (OUTPATIENT)
Dept: ORTHOPEDICS | Facility: CLINIC | Age: 39
End: 2019-10-28
Payer: COMMERCIAL

## 2019-10-28 VITALS
BODY MASS INDEX: 31.81 KG/M2 | SYSTOLIC BLOOD PRESSURE: 142 MMHG | HEIGHT: 73 IN | WEIGHT: 240 LBS | DIASTOLIC BLOOD PRESSURE: 97 MMHG

## 2019-10-28 DIAGNOSIS — M25.561 ACUTE PAIN OF RIGHT KNEE: Primary | ICD-10-CM

## 2019-10-28 DIAGNOSIS — Z82.69 FAMILY HISTORY OF GOUT: ICD-10-CM

## 2019-10-28 PROCEDURE — 86618 LYME DISEASE ANTIBODY: CPT | Performed by: FAMILY MEDICINE

## 2019-10-28 PROCEDURE — 99214 OFFICE O/P EST MOD 30 MIN: CPT | Performed by: FAMILY MEDICINE

## 2019-10-28 PROCEDURE — 36415 COLL VENOUS BLD VENIPUNCTURE: CPT | Performed by: FAMILY MEDICINE

## 2019-10-28 RX ORDER — PREDNISONE 20 MG/1
20 TABLET ORAL 2 TIMES DAILY
Qty: 10 TABLET | Refills: 0 | Status: SHIPPED | OUTPATIENT
Start: 2019-10-28 | End: 2019-11-09

## 2019-10-28 ASSESSMENT — MIFFLIN-ST. JEOR: SCORE: 2052.51

## 2019-10-28 NOTE — LETTER
10/28/2019         RE: Kaleb Benites  [address] [facility] [address]        Dear Colleague,    Thank you for referring your patient, Kaleb Benites, to the  SP[facility]TS MEDICINE. Please see a copy of my visit note below.    Musculoskeletal Problem        [facility]   Follow-up Visit s Oct 28, 2019    PCP: No Ref-Primary, Physician      Subjective:  Kaleb is a 39 year old male who is seen in follow up for evaluation of   Chief Complaint   Patient presents with     Right Knee - Pain     His last visit was on 10/21/2019.  Since that time, symptoms have been better than before. Kaleb Benites is accompanied today by self.   patient had knee MRI since last visit.   Patient reports 50% improvement since last visit.  Patient has noticed improved symptoms with ibuprofen and rest treatment.  Patient has slight swelling  Pain is located right knee, anterior and medial  getting progressively better.  Patient is using no aids.    Patient denies any new injuries.    Today he reports a family history of gout    Patient's past medical, surgical, social and family histories are reviewed today.    History from previous visit on 10/21/2019  Injury: Patient describes injury as was doing housework last weekend and noticed pain the next day.        Location of Pain: right knee anterior , nonradiating   Duration of Pain: acute, 5 day(s),   Rating of Pain at worst: 8/10  Rating of Pain Currently: 5/10  Pain is better with: activity avoidance   Pain is worse with: standing and walking   Treatment so far consists of: compression, ice, rest and ibuprofen  Associated symptoms: swelling Mild  Recent imaging completed: No recent imaging completed.  Prior History of related problems: football injuries in high school, had a flare up in his 20's     Social History: is employed as a/an office job        Patient Active Problem List    Diagnosis Date Noted     Health Care Home 04/02/2014      "Priority: Medium     Living will, counseling/discussion 04/02/2014     Priority: Medium     Advance Care Planning:   ACP Review and Resources Provided:  Reviewed chart for advance care plan.  Kaleb Benites has no plan or code status on file. Discussed available resources and provided with information. Confirmed code status reflects current choices pending further ACP discussions.  Confirmed/documented designated decision maker(s). See permanent comments section of demographics in clinical tab.   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   All other systems reviewed and are negative.        Physical Exam  Musculoskeletal:      Right knee: He exhibits effusion.       BP (!) 142/97   Ht 1.854 m (6' 1\")   Wt 108.9 kg (240 lb)   BMI 31.66 kg/m     Constitutional:well-developed, well-nourished, and in no distress.   Cardiovascular: Intact distal pulses.    Neurological: alert. Gait Normal:   Gait, station, stance, and balance appear normal for age  Skin: Skin is warm and dry.   Psychiatric: Mood and affect normal. "   Respiratory: unlabored, speaks in full sentences  Lymph: no LAD, no lymphangitis            Right Knee Exam     Tenderness   The patient is experiencing tenderness in the medial joint line.    Range of Motion   Extension: normal   Flexion:  120 abnormal     Tests   Catrachito:  Medial - positive Lateral - negative  Varus: negative Valgus: negative  Lachman:  Anterior - negative      Drawer:  Anterior - negative    Posterior - negative  Patellar apprehension: negative    Other   Erythema: absent  Scars: absent  Sensation: normal  Pulse: present  Swelling: mild  Effusion: effusion present    Comments:  No erythema, but increased warmth noted about the knee today.          Recent Results (from the past 744 hour(s))   MR Knee Right w/o Contrast    Narrative    MR KNEE RIGHT WITHOUT CONTRAST October 22, 2019 6:05 PM    HISTORY: Right acute knee pain with swelling.    TECHNIQUE: Sagittal proton density and T2, coronal T1, and coronal and  transverse fat suppressed T2 weighted images.    COMPARISON: None.    FINDINGS:   Medial Meniscus: No tear, displaced fragment, or extrusion.       Lateral Meniscus: No tear, displaced fragment, or extrusion.       Anterior Cruciate Ligament: Intact.     Posterior Cruciate Ligament: Intact.     Medial Collateral Ligament: Intact.    Lateral Collateral Ligament Complex, Popliteus Tendon: The fibular  collateral ligament, biceps femoris tendon, popliteal tendon, and  iliotibial band are intact.    Osseous Structures and Cartilaginous Surfaces: Grade 2 chondromalacia  mid medial femoral condyle. Lateral compartment and patellofemoral  compartment articular surfaces appear intact. Bone marrow signal is  unremarkable.    Extensor Mechanism: The quadriceps and infrapatellar tendons are  intact. The medial and lateral patellar retinacula appear  unremarkable.    Joint Space: Large joint effusion.  No definite articular bodies are  demonstrated. Mildly prominent suprapatellar  plica.    Additional Findings: No semimembranosus-tibial collateral ligament or  pes anserine bursitis. No Baker's cyst. Mild edema in the distal  biceps femoris muscle consistent with muscle strain. Mild subcutaneous  edema surrounding the knee slightly more prominent laterally.      Impression    IMPRESSION:    1. No evidence of meniscus tear or ligamentous injury.  2. 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  3. Large joint effusion. Mildly prominent/thickened suprapatellar  plica.  4. Mild edema in the distal biceps femoris muscle consistent with  muscle strain. Mild subcutaneous edema surrounding the knee slightly  more prominent laterally.    JESSICA WILLIAM MD         ASSESSMENT/PLAN    ICD-10-CM    1. Acute pain of right knee M25.561 Erythrocyte sedimentation rate auto     CRP inflammation     Uric acid     Lyme Disease Rere with reflex to WB Serum     predniSONE (DELTASONE) 20 MG tablet   2. Family history of gout Z82.69 Erythrocyte sedimentation rate auto     CRP inflammation     Uric acid     Degenerative findings noted on MRI but these are fairly mild and it would seem unlikely to produce the type of pain and effusion he reports.  Upon further history, he reports a family history of gout, and chart review indicates an elevated uric acid level last year following pain in the ankle.  This raises the possibility of gout is being related to today's symptoms.  We will perform rheumatologic screen but also include a Lyme's disease check due to contact with ticks near his home in the early part of the summer.  A short course of prednisone is given for diagnostic and therapeutic purposes.  Will notify of labs via [employer].  He is aware of long-term preventative medications options given his family history.    Again, thank you for allowing me to participate in the care of your patient.        Sincerely,        Brady Odell MD

## 2019-10-28 NOTE — PROGRESS NOTES
Musculoskeletal Problem        [facility]   Follow-up Visit s Oct 28, 2019    PCP: No Ref-Primary, Physician      Subjective:  Kaleb is a 39 year old male who is seen in follow up for evaluation of   Chief Complaint   Patient presents with     Right Knee - Pain     His last visit was on 10/21/2019.  Since that time, symptoms have been better than before. Kaleb Benites is accompanied today by self.   patient had knee MRI since last visit.   Patient reports 50% improvement since last visit.  Patient has noticed improved symptoms with ibuprofen and rest treatment.  Patient has slight swelling  Pain is located right knee, anterior and medial  getting progressively better.  Patient is using no aids.    Patient denies any new injuries.    Today he reports a family history of gout    Patient's past medical, surgical, social and family histories are reviewed today.    History from previous visit on 10/21/2019  Injury: Patient describes injury as was doing housework last weekend and noticed pain the next day.        Location of Pain: right knee anterior , nonradiating   Duration of Pain: acute, 5 day(s),   Rating of Pain at worst: 8/10  Rating of Pain Currently: 5/10  Pain is better with: activity avoidance   Pain is worse with: standing and walking   Treatment so far consists of: compression, ice, rest and ibuprofen  Associated symptoms: swelling Mild  Recent imaging completed: No recent imaging completed.  Prior History of related problems: football injuries in high school, had a flare up in his 20's     Social History: is employed as a/an office job        Patient Active Problem List    Diagnosis Date Noted     Health Care Home 04/02/2014     Priority: Medium     Living will, counseling/discussion 04/02/2014     Priority: Medium     Advance Care Planning:   ACP Review and Resources Provided:  Reviewed chart for advance care plan.  Kaleb Benites has no plan or code status on file.  "Discussed available resources and provided with information. Confirmed code status reflects current choices pending further ACP discussions.  Confirmed/documented designated decision maker(s). See permanent comments section of demographics in clinical tab.   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   All other systems reviewed and are negative.        Physical Exam  Musculoskeletal:      Right knee: He exhibits effusion.       BP (!) 142/97   Ht 1.854 m (6' 1\")   Wt 108.9 kg (240 lb)   BMI 31.66 kg/m    Constitutional:well-developed, well-nourished, and in no distress.   Cardiovascular: Intact distal pulses.    Neurological: alert. Gait Normal:   Gait, station, stance, and balance appear normal for age  Skin: Skin is warm and dry.   Psychiatric: Mood and affect normal.   Respiratory: unlabored, speaks in full sentences  Lymph: no LAD, no lymphangitis            Right Knee Exam     Tenderness   The patient is experiencing tenderness in the medial joint line.    Range of Motion   Extension: normal   Flexion:  120 " abnormal     Tests   Catrachito:  Medial - positive Lateral - negative  Varus: negative Valgus: negative  Lachman:  Anterior - negative      Drawer:  Anterior - negative    Posterior - negative  Patellar apprehension: negative    Other   Erythema: absent  Scars: absent  Sensation: normal  Pulse: present  Swelling: mild  Effusion: effusion present    Comments:  No erythema, but increased warmth noted about the knee today.          Recent Results (from the past 744 hour(s))   MR Knee Right w/o Contrast    Narrative    MR KNEE RIGHT WITHOUT CONTRAST October 22, 2019 6:05 PM    HISTORY: Right acute knee pain with swelling.    TECHNIQUE: Sagittal proton density and T2, coronal T1, and coronal and  transverse fat suppressed T2 weighted images.    COMPARISON: None.    FINDINGS:   Medial Meniscus: No tear, displaced fragment, or extrusion.       Lateral Meniscus: No tear, displaced fragment, or extrusion.       Anterior Cruciate Ligament: Intact.     Posterior Cruciate Ligament: Intact.     Medial Collateral Ligament: Intact.    Lateral Collateral Ligament Complex, Popliteus Tendon: The fibular  collateral ligament, biceps femoris tendon, popliteal tendon, and  iliotibial band are intact.    Osseous Structures and Cartilaginous Surfaces: Grade 2 chondromalacia  mid medial femoral condyle. Lateral compartment and patellofemoral  compartment articular surfaces appear intact. Bone marrow signal is  unremarkable.    Extensor Mechanism: The quadriceps and infrapatellar tendons are  intact. The medial and lateral patellar retinacula appear  unremarkable.    Joint Space: Large joint effusion.  No definite articular bodies are  demonstrated. Mildly prominent suprapatellar plica.    Additional Findings: No semimembranosus-tibial collateral ligament or  pes anserine bursitis. No Baker's cyst. Mild edema in the distal  biceps femoris muscle consistent with muscle strain. Mild subcutaneous  edema surrounding the knee slightly more  prominent laterally.      Impression    IMPRESSION:    1. No evidence of meniscus tear or ligamentous injury.  2. 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  3. Large joint effusion. Mildly prominent/thickened suprapatellar  plica.  4. Mild edema in the distal biceps femoris muscle consistent with  muscle strain. Mild subcutaneous edema surrounding the knee slightly  more prominent laterally.    JESSICA WILLIAM MD         ASSESSMENT/PLAN    ICD-10-CM    1. Acute pain of right knee M25.561 Erythrocyte sedimentation rate auto     CRP inflammation     Uric acid     Lyme Disease Rere with reflex to WB Serum     predniSONE (DELTASONE) 20 MG tablet   2. Family history of gout Z82.69 Erythrocyte sedimentation rate auto     CRP inflammation     Uric acid     Degenerative findings noted on MRI but these are fairly mild and it would seem unlikely to produce the type of pain and effusion he reports.  Upon further history, he reports a family history of gout, and chart review indicates an elevated uric acid level last year following pain in the ankle.  This raises the possibility of gout is being related to today's symptoms.  We will perform rheumatologic screen but also include a Lyme's disease check due to contact with ticks near his home in the early part of the summer.  A short course of prednisone is given for diagnostic and therapeutic purposes.  Will notify of labs via Aras.  He is aware of long-term preventative medications options given his family history.

## 2019-10-30 LAB — B BURGDOR IGG+IGM SER QL: 0.36 (ref 0–0.89)

## 2019-11-04 ENCOUNTER — MYC MEDICAL ADVICE (OUTPATIENT)
Dept: ORTHOPEDICS | Facility: CLINIC | Age: 39
End: 2019-11-04

## 2019-11-09 ENCOUNTER — MYC REFILL (OUTPATIENT)
Dept: ORTHOPEDICS | Facility: CLINIC | Age: 39
End: 2019-11-09

## 2019-11-09 DIAGNOSIS — M25.561 ACUTE PAIN OF RIGHT KNEE: ICD-10-CM

## 2019-11-11 ENCOUNTER — MYC MEDICAL ADVICE (OUTPATIENT)
Dept: ORTHOPEDICS | Facility: CLINIC | Age: 39
End: 2019-11-11

## 2019-11-11 RX ORDER — PREDNISONE 20 MG/1
20 TABLET ORAL 2 TIMES DAILY
Qty: 10 TABLET | Refills: 0 | Status: SHIPPED | OUTPATIENT
Start: 2019-11-11 | End: 2022-04-28

## 2019-11-13 DIAGNOSIS — M25.561 ACUTE PAIN OF RIGHT KNEE: ICD-10-CM

## 2019-11-13 DIAGNOSIS — Z82.69 FAMILY HISTORY OF GOUT: ICD-10-CM

## 2019-11-13 LAB — ERYTHROCYTE [SEDIMENTATION RATE] IN BLOOD BY WESTERGREN METHOD: 14 MM/H (ref 0–15)

## 2019-11-13 PROCEDURE — 36415 COLL VENOUS BLD VENIPUNCTURE: CPT | Performed by: FAMILY MEDICINE

## 2019-11-13 PROCEDURE — 85652 RBC SED RATE AUTOMATED: CPT | Performed by: FAMILY MEDICINE

## 2019-11-13 PROCEDURE — 84550 ASSAY OF BLOOD/URIC ACID: CPT | Performed by: FAMILY MEDICINE

## 2019-11-13 PROCEDURE — 86140 C-REACTIVE PROTEIN: CPT | Performed by: FAMILY MEDICINE

## 2019-11-14 LAB
CRP SERPL-MCNC: 13 MG/L (ref 0–8)
URATE SERPL-MCNC: 5.9 MG/DL (ref 3.5–7.2)

## 2019-12-05 ENCOUNTER — TRANSFERRED RECORDS (OUTPATIENT)
Dept: HEALTH INFORMATION MANAGEMENT | Facility: CLINIC | Age: 39
End: 2019-12-05

## 2020-02-16 ENCOUNTER — HEALTH MAINTENANCE LETTER (OUTPATIENT)
Age: 40
End: 2020-02-16

## 2020-11-29 ENCOUNTER — HEALTH MAINTENANCE LETTER (OUTPATIENT)
Age: 40
End: 2020-11-29

## 2021-04-10 ENCOUNTER — HEALTH MAINTENANCE LETTER (OUTPATIENT)
Age: 41
End: 2021-04-10

## 2021-09-19 ENCOUNTER — HEALTH MAINTENANCE LETTER (OUTPATIENT)
Age: 41
End: 2021-09-19

## 2022-02-28 ENCOUNTER — VIRTUAL VISIT (OUTPATIENT)
Dept: URGENT CARE | Facility: CLINIC | Age: 42
End: 2022-02-28
Payer: COMMERCIAL

## 2022-02-28 DIAGNOSIS — M10.9 ACUTE GOUT OF RIGHT ANKLE, UNSPECIFIED CAUSE: Primary | ICD-10-CM

## 2022-02-28 PROCEDURE — 99213 OFFICE O/P EST LOW 20 MIN: CPT | Mod: 95 | Performed by: PHYSICIAN ASSISTANT

## 2022-02-28 RX ORDER — PREDNISONE 20 MG/1
20 TABLET ORAL 2 TIMES DAILY
Qty: 10 TABLET | Refills: 0 | Status: SHIPPED | OUTPATIENT
Start: 2022-02-28 | End: 2022-03-05

## 2022-02-28 NOTE — PROGRESS NOTES
Gautam is a 41 year old who is being evaluated via a billable video visit.      Video Start Time: 3:33 PM    Assessment & Plan     Acute gout of right ankle, unspecified cause  - predniSONE (DELTASONE) 20 MG tablet; Take 1 tablet (20 mg) by mouth 2 times daily for 5 days    I will treat gout flare with prednisone and have him follow up if not resolving or if recurring.     Layne Rodriguez PA-C  Virtual Urgent Care  [facility] VIRTUAL URGENT CARE    Subjective   Gautam is a 41 year old who presents for the following health issues : Gout flare     HPI - Patient has been having a gout flare in the right ankle the last 2 days. He cannot pinpoint any diet changes that triggered this flare but has been trying to eat healthier. He reports that prednisone has worked well in the past but he has not responded well to indomethacin. He is not having fever, chills or other systemic symptoms.      Review of Systems   Constitutional, HEENT, cardiovascular, pulmonary, gi and gu systems are negative, except as otherwise noted.      Objective           Vitals:  No vitals were obtained today due to virtual visit.    Physical Exam   GENERAL: Healthy, alert and no distress  EYES: Eyes grossly normal to inspection.  No discharge or erythema, or obvious scleral/conjunctival abnormalities.  RESP: No audible wheeze, cough, or visible cyanosis.  No visible retractions or increased work of breathing.    SKIN: Visible skin clear. No significant rash, abnormal pigmentation or lesions.  NEURO: Cranial nerves grossly intact.  Mentation and speech appropriate for age.  PSYCH: Mentation appears normal, affect normal/bright, judgement and insight intact, normal speech and appearance well-groomed.        Video-Visit Details    Type of service:  Video Visit    Video End Time:3:37 PM    Originating Location (pt. Location): Home    Distant Location (provider location):  [facility] URGENT [facility]ARE     Platform used for Video Visit:  AmWell

## 2022-03-06 ENCOUNTER — HOSPITAL ENCOUNTER (EMERGENCY)
Facility: CLINIC | Age: 42
Discharge: HOME | End: 2022-03-06
Attending: EMERGENCY MEDICINE | Admitting: EMERGENCY MEDICINE
Payer: COMMERCIAL

## 2022-03-06 VITALS
TEMPERATURE: 98.2 F | DIASTOLIC BLOOD PRESSURE: 94 MMHG | OXYGEN SATURATION: 96 % | HEART RATE: 85 BPM | SYSTOLIC BLOOD PRESSURE: 141 MMHG | RESPIRATION RATE: 18 BRPM

## 2022-03-06 DIAGNOSIS — R00.2 PALPITATIONS: ICD-10-CM

## 2022-03-06 DIAGNOSIS — R03.0 ELEVATED BLOOD PRESSURE READING WITHOUT DIAGNOSIS OF HYPERTENSION: ICD-10-CM

## 2022-03-06 LAB
ANION GAP SERPL CALCULATED.3IONS-SCNC: 7 MMOL/L (ref 3–14)
BASOPHILS # BLD AUTO: 0.1 10E3/UL (ref 0–0.2)
BASOPHILS NFR BLD AUTO: 1 %
BUN SERPL-MCNC: 14 MG/DL (ref 7–30)
CALCIUM SERPL-MCNC: 9.3 MG/DL (ref 8.5–10.1)
CHLORIDE BLD-SCNC: 106 MMOL/L (ref 94–109)
CO2 SERPL-SCNC: 24 MMOL/L (ref 20–32)
CREAT SERPL-MCNC: 0.72 MG/DL (ref 0.66–1.25)
EOSINOPHIL # BLD AUTO: 0 10E3/UL (ref 0–0.7)
EOSINOPHIL NFR BLD AUTO: 0 %
ERYTHROCYTE [DISTWIDTH] IN BLOOD BY AUTOMATED COUNT: 12.6 % (ref 10–15)
ETHANOL SERPL-MCNC: <0.01 G/DL
GFR SERPL CREATININE-BSD FRML MDRD: >90 ML/MIN/1.73M2
GLUCOSE BLD-MCNC: 103 MG/DL (ref 70–99)
HCT VFR BLD AUTO: 47.6 % (ref 40–53)
HGB BLD-MCNC: 16 G/DL (ref 13.3–17.7)
HOLD SPECIMEN: NORMAL
IMM GRANULOCYTES # BLD: 0 10E3/UL
IMM GRANULOCYTES NFR BLD: 0 %
LYMPHOCYTES # BLD AUTO: 1.4 10E3/UL (ref 0.8–5.3)
LYMPHOCYTES NFR BLD AUTO: 15 %
MCH RBC QN AUTO: 30 PG (ref 26.5–33)
MCHC RBC AUTO-ENTMCNC: 33.6 G/DL (ref 31.5–36.5)
MCV RBC AUTO: 89 FL (ref 78–100)
MONOCYTES # BLD AUTO: 0.8 10E3/UL (ref 0–1.3)
MONOCYTES NFR BLD AUTO: 9 %
NEUTROPHILS # BLD AUTO: 6.8 10E3/UL (ref 1.6–8.3)
NEUTROPHILS NFR BLD AUTO: 75 %
NRBC # BLD AUTO: 0 10E3/UL
NRBC BLD AUTO-RTO: 0 /100
PLATELET # BLD AUTO: 296 10E3/UL (ref 150–450)
POTASSIUM BLD-SCNC: 3.8 MMOL/L (ref 3.4–5.3)
RBC # BLD AUTO: 5.33 10E6/UL (ref 4.4–5.9)
SODIUM SERPL-SCNC: 137 MMOL/L (ref 133–144)
TROPONIN I SERPL HS-MCNC: 4 NG/L
WBC # BLD AUTO: 9.2 10E3/UL (ref 4–11)

## 2022-03-06 PROCEDURE — 85025 COMPLETE CBC W/AUTO DIFF WBC: CPT | Performed by: EMERGENCY MEDICINE

## 2022-03-06 PROCEDURE — 82077 ASSAY SPEC XCP UR&BREATH IA: CPT | Performed by: EMERGENCY MEDICINE

## 2022-03-06 PROCEDURE — 93005 ELECTROCARDIOGRAM TRACING: CPT

## 2022-03-06 PROCEDURE — 36415 COLL VENOUS BLD VENIPUNCTURE: CPT | Performed by: EMERGENCY MEDICINE

## 2022-03-06 PROCEDURE — 99284 EMERGENCY DEPT VISIT MOD MDM: CPT

## 2022-03-06 PROCEDURE — 84484 ASSAY OF TROPONIN QUANT: CPT | Performed by: EMERGENCY MEDICINE

## 2022-03-06 PROCEDURE — 82310 ASSAY OF CALCIUM: CPT | Performed by: EMERGENCY MEDICINE

## 2022-03-06 ASSESSMENT — ENCOUNTER SYMPTOMS
CHEST TIGHTNESS: 1
SHORTNESS OF BREATH: 0
PALPITATIONS: 1
ARTHRALGIAS: 0

## 2022-03-06 NOTE — ED TRIAGE NOTES
Patient states that he has been having palpitations and a feelings as if his heart is fluttering since this morning.  Patient has no hx of heart issues. Alert and orientated X 4

## 2022-03-07 LAB
ATRIAL RATE - MUSE: 83 BPM
DIASTOLIC BLOOD PRESSURE - MUSE: NORMAL MMHG
INTERPRETATION ECG - MUSE: NORMAL
P AXIS - MUSE: 19 DEGREES
PR INTERVAL - MUSE: 156 MS
QRS DURATION - MUSE: 100 MS
QT - MUSE: 372 MS
QTC - MUSE: 437 MS
R AXIS - MUSE: -28 DEGREES
SYSTOLIC BLOOD PRESSURE - MUSE: NORMAL MMHG
T AXIS - MUSE: 34 DEGREES
VENTRICULAR RATE- MUSE: 83 BPM

## 2022-03-07 NOTE — ED PROVIDER NOTES
"  History   Chief Complaint:  Palpitations    The history is provided by the patient.      Kaleb Benites is a 41 year old male who presents for evaluation of diffuse chest tightness and palpitations, persisting since onset approximately 9 hours ago. Earlier this morning, the patient had 2 cups of coffee, after which time he experienced sudden onset of discomfort to his chest with a feeling of \"heart fluttering\" and \"racing heartbeat.\" These symptoms have persisted at a constant rate since onset but he states that \"nothing has felt urgent or emergent to me.\" However, ongoing discomfort prompted his concern and presentation to the ED at this time. The patient does report drinking 1 L of alcohol over the past 2 days and states that \"I've had a racing heart when I'm hung over, but this is different.\" The patient drinks a pot of coffee daily and denies use of energy drinks. He has no personal cardiac history, including any exertional chest pain, PE, or DVT, although does report that his great grandfather  of heart disease. He denies history of alcohol withdrawal, seizures, anxiety, or other. The patient denies any pain to his chest, radiating discomfort to his arms, shortness of breath, leg swelling, or other symptoms. He denies use of drugs or other illegal substances.    Review of Systems   Respiratory: Positive for chest tightness. Negative for shortness of breath.    Cardiovascular: Positive for palpitations. Negative for chest pain and leg swelling.   Musculoskeletal: Negative for arthralgias.   All other systems reviewed and are negative.      Allergies:  No known drug allergies    Medications:  Prednisone  Cyclobenzaprine  Colchicine  Febuxostat    Past Medical History:     Living will, counseling/discussion  Health Care Home  Hyperuricemia    Past Surgical History:    Mole removed    Family History:    Arthritis    Social History:  The patient presented alone.  The patient arrived in a private " vehicle.    Physical Exam     Patient Vitals for the past 24 hrs:   BP Temp Temp src Pulse Resp SpO2   03/06/22 1959 (!) 141/94 98.2  F (36.8  C) Oral -- 18 --   03/06/22 1945 (!) 147/100 -- -- 85 18 96 %   03/06/22 1930 (!) 171/93 -- -- 80 18 97 %   03/06/22 1915 (!) 152/103 -- -- 85 19 95 %   03/06/22 1900 (!) 144/96 -- -- 84 18 97 %   03/06/22 1845 (!) 151/100 -- -- 93 18 97 %   03/06/22 1830 (!) 147/97 -- -- 85 18 98 %   03/06/22 1815 (!) 152/103 -- -- 89 18 --   03/06/22 1758 (!) 156/101 -- Oral 97 18 99 %     Physical Exam  General: Alert, no acute distress; well appearing  HEENT:  Moist mucous membranes.  Conjunctiva normal.   CV:  RRR, no m/r/g, skin warm and well perfused  Pulm:  CTAB, no wheezes/ronchi/rales.  No acute distress, breathing comfortably  GI:  Soft, nontender, nondistended.  No rebound or guarding.  Normal bowel sounds  MSK:  Moving all extremities.  No focal areas of edema, erythema, or tenderness  Skin:  WWP, no rashes, no lower extremity edema, skin color normal, no diaphoresis  Psych:  Well-appearing, normal affect, regular speech    Emergency Department Course     ECG  ECG obtained at 1810, ECG read at 1813  Normal sinus rhythm with sinus arrhythmia.  Cannot rule out anterior infarct, age undetermined.  Abnormal ECG.   Rate 83 bpm. TX interval 156 ms. QRS duration 100 ms. QT/QTc 372/437 ms. P-R-T axes 19 -28 34.     Laboratory:  Labs Ordered and Resulted from Time of ED Arrival to Time of ED Departure   BASIC METABOLIC PANEL - Abnormal       Result Value    Sodium 137      Potassium 3.8      Chloride 106      Carbon Dioxide (CO2) 24      Anion Gap 7      Urea Nitrogen 14      Creatinine 0.72      Calcium 9.3      Glucose 103 (*)     GFR Estimate >90     TROPONIN I - Normal    Troponin I High Sensitivity 4     ETHYL ALCOHOL LEVEL - Normal    Alcohol ethyl <0.01     CBC WITH PLATELETS AND DIFFERENTIAL    WBC Count 9.2      RBC Count 5.33      Hemoglobin 16.0      Hematocrit 47.6      MCV 89       MCH 30.0      MCHC 33.6      RDW 12.6      Platelet Count 296      % Neutrophils 75      % Lymphocytes 15      % Monocytes 9      % Eosinophils 0      % Basophils 1      % Immature Granulocytes 0      NRBCs per 100 WBC 0      Absolute Neutrophils 6.8      Absolute Lymphocytes 1.4      Absolute Monocytes 0.8      Absolute Eosinophils 0.0      Absolute Basophils 0.1      Absolute Immature Granulocytes 0.0      Absolute NRBCs 0.0       Emergency Department Course:     Reviewed:  I reviewed nursing notes, vitals, past medical history, Care Everywhere and [facility].    Assessments:  1819 I obtained history and examined the patient as noted above.   1935 I rechecked the patient and explained findings. I believe that they are safe for discharge at this time.    Disposition:  The patient was discharged to home.     Impression & Plan     Medical Decision Making:  Kaleb Benites is a 41 year old male with no significant past medical history presents to the ER for evaluation of chest tightness and palpitations.  Please see above for details of HPI and exam.  Patient is hypertensive but otherwise vitally stable and well-appearing.  Work-up in the ER thus far is reassuring.  EKG shows no acute ischemic changes or signs of dysrhythmia.  No evidence of WPW, Brugada, prolonged QT or signs of HOCM.  Troponin is normal despite greater than 6 hours of symptoms making ACS unlikely.  Likewise, he is low risk for PE and is PERC negative making this unlikely.  Doubt aortic dissection based on history and exam.  Lung fields are clear and he has no respiratory complaints requiring chest imaging.  The rest of his lab studies are normal.  He does admit to drinking a pot of coffee daily and certainly this could contribute to his symptoms.  Recommend decrease caffeine use and he is agreeable.  With reasonable clinical certainty I feel he safe to discharge home.  Zio patch ordered for outpatient monitoring.  Patient currently does not  have a PCP and thus PC referral was made in the ER.  Reasons to return to the ER were discussed and all questions were answered prior to patient discharge.      Diagnosis:    ICD-10-CM    1. Palpitations  R00.2 Leadless EKG Monitor 3 to 7 Days     Primary Care Referral   2. Elevated blood pressure reading without diagnosis of hypertension  R03.0      Scribe Disclosure:  I, Soco Marrero, am serving as a scribe at 6:12 PM on 3/6/2022 to document services personally performed by Ga Talley MD based on my observations and the provider's statements to me.     Ga Talley MD  03/07/22 0030

## 2022-03-07 NOTE — DISCHARGE INSTRUCTIONS
Discharge Instructions  Palpitations    Palpitations are an unusual awareness of your heartbeat. People often describe this as the heart skipping, fluttering, racing, irregular, or pounding. At this time, your provider has found no signs that your palpitations are due to a serious or life-threatening condition. However, sometimes there is a serious problem that does not show up right away.    Palpitations can be caused by caffeine, cigarettes, diet pills, energy drinks or supplements, other stimulants, and medications and street drugs. They can also be caused by anxiety, hormone conditions such as high thyroid, and other medical conditions. Sometimes they are a sign of abnormal rhythm in the heart. At this time, your provider did not find any dangerous cause of your symptoms.    Generally, every Emergency Department visit should have a follow-up clinic visit with either a primary or a specialty clinic/provider. Please follow-up as instructed by your emergency provider today.    Return to the Emergency Department if:  You get chest pain or tightness.  You are short of breath.  You get very weak or tired.  You pass out or faint.  Your heart rate is over 120 beats per minute for more than 10 minutes while you are resting.   You have anything else that worries you.    What can I do to help myself?  Fill any prescriptions the provider gave you and take them right away.   Follow your provider s instructions about the prescription medicines you are on. Sometimes the provider may tell you to stop taking a medicine or change the dose.  If you smoke, this may be a good time to quit! The less you can smoke, the better.  Do not use energy drinks, diet pills, or stimulants. Limit your use of caffeine.  If you were given a prescription for medicine here today, be sure to read all of the information (including the package insert) that comes with your prescription.  This will include important information about the medicine, its  side effects, and any warnings that you need to know about.  The pharmacist who fills the prescription can provide more information and answer questions you may have about the medicine.  If you have questions or concerns that the pharmacist cannot address, please call or return to the Emergency Department.     Remember that you can always come back to the Emergency Department if you are not able to see your regular provider in the amount of time listed above, if you get any new symptoms, or if there is anything that worries you.      Discharge Instructions  Hypertension - High Blood Pressure    During you visit to the Emergency Department, your blood pressure was higher than the recommended blood pressure.  This may be related to stress, pain, medication or other temporary conditions. In these cases, your blood pressure may return to normal on its own. If you have a history of high blood pressure, you may need to have your provider adjust your medications. Sometimes, your high measurement here may indicate that you have developed high blood pressure that will stay high unless it is treated. As a general rule, high blood pressure causes problems over years rather than days, weeks, or months. So, while it is important to treat blood pressure, it is rarely important to treat blood pressure immediately. Occasionally we will begin a medication in the Emergency Department; more often we will recommend close follow-up for medications with a primary doctor/clinic.    Generally, every Emergency Department visit should have a follow-up clinic visit with either a primary or a specialty clinic/provider. Please follow-up as instructed by your emergency provider today.    Return to the Emergency Department if you start to have:  A severe headache.  Chest pain.  Shortness of breath.  Weakness or numbness that affects one part of the body.  Confusion.  Vision changes.  Significant swelling of legs and/or eyes.  A reaction to any  medication started in the Emergency Department.    What can I do to help myself?  Avoid alcohol.  Take any blood pressure medicine that you are prescribed.  Get a good night s sleep.  Lower your salt intake.  Exercise.  Lose weight.  Manage stress.  See your doctor regularly    If blood pressure medication was started in the Emergency Department:  The medicine may not have an immediate effect. The body and brain determine what blood pressure you have. The medicine s job is to retrain the body s  thermostat  to a lower blood pressure.  You will need to follow up with your provider to see how this medicine is working for you.  If you were given a prescription for medicine here today, be sure to read all of the information (including the package insert) that comes with your prescription.  This will include important information about the medicine, its side effects, and any warnings that you need to know about.  The pharmacist who fills the prescription can provide more information and answer questions you may have about the medicine.  If you have questions or concerns that the pharmacist cannot address, please call or return to the Emergency Department.   Remember that you can always come back to the Emergency Department if you are not able to see your regular provider in the amount of time listed above, if you get any new symptoms, or if there is anything that worries you.

## 2022-03-09 ENCOUNTER — HOSPITAL ENCOUNTER (OUTPATIENT)
Dept: CARDIOLOGY | Facility: CLINIC | Age: 42
Discharge: HOME | End: 2022-03-09
Attending: EMERGENCY MEDICINE | Admitting: EMERGENCY MEDICINE
Payer: COMMERCIAL

## 2022-03-09 DIAGNOSIS — R00.2 PALPITATIONS: ICD-10-CM

## 2022-03-09 PROCEDURE — 93242 EXT ECG>48HR<7D RECORDING: CPT

## 2022-03-09 PROCEDURE — 93244 EXT ECG>48HR<7D REV&INTERPJ: CPT | Performed by: INTERNAL MEDICINE

## 2022-03-15 ENCOUNTER — OFFICE VISIT (OUTPATIENT)
Dept: FAMILY MEDICINE | Facility: CLINIC | Age: 42
End: 2022-03-15
Payer: COMMERCIAL

## 2022-03-15 VITALS
DIASTOLIC BLOOD PRESSURE: 84 MMHG | RESPIRATION RATE: 16 BRPM | HEIGHT: 73 IN | OXYGEN SATURATION: 99 % | BODY MASS INDEX: 34.33 KG/M2 | HEART RATE: 67 BPM | TEMPERATURE: 98.3 F | SYSTOLIC BLOOD PRESSURE: 134 MMHG | WEIGHT: 259 LBS

## 2022-03-15 DIAGNOSIS — Z13.220 SCREENING FOR HYPERLIPIDEMIA: ICD-10-CM

## 2022-03-15 DIAGNOSIS — R00.2 PALPITATIONS: Primary | ICD-10-CM

## 2022-03-15 PROCEDURE — 99213 OFFICE O/P EST LOW 20 MIN: CPT | Performed by: FAMILY MEDICINE

## 2022-03-15 ASSESSMENT — ENCOUNTER SYMPTOMS
SHORTNESS OF BREATH: 0
FEVER: 0

## 2022-03-15 NOTE — PROGRESS NOTES
Assessment & Plan     Palpitations  Resolved.  Zio patch result pending, reviewed labs from ER visit, no electrolyte derangements or anemia.  Sounds to have mild symptoms of alcohol withdrawal in combination of caffeine overuse as a likely precipitant.  Screen for diabetes.  Schedule annual preventative visit, updated PCP.  - Hemoglobin A1c    Screening for hyperlipidemia  - Lipid panel reflex to direct LDL Fasting    BMI 34.0-34.9,adult  May be a good candidate for Contrave.  - Hemoglobin A1c        Return in about 1 month (around 4/15/2022) for Annual Preventative Visit..    Trevor Chino MD  [facility] JONATHON Florez is a 41 year old who presents for the following health issues     HPI       Patient is a pleasant 41 who presents the clinic for ER follow-up.  He was seen in the ER on 3-6-2022 when he initially presented for palpitation and was found to have elevated blood pressure readings without previous history of hypertension.  Prior to going the ER, patient states he was celebrating with his friends and eating and drinking alcohol more excessively than usual before he kick starts a Spring diet as he would like to get on track for losing some weight.  Admittedly over the last 2 days prior to going to the ER he drank a liter of vodka was mixing with cranberry.  Drink more coffee than usual the next day and suddenly happen to have the episode while driving.  Since discharge, he was given a Zio patch monitor which is currently being reviewed by cardiology.  No family history of thyroid issues however maternal grandfather had heart disease.      Currently feeling well and has no additional concerns.  History of gout, previously was followed with rheumatology.  Additionally, history of tinea versicolor was using Selsun Blue        Review of Systems   Constitutional: Negative for fever.   Respiratory: Negative for shortness of breath.    Cardiovascular: Negative for chest pain.     "        Objective    /84 (BP Location: Right arm, Patient Position: Chair, Cuff Size: Adult Large)   Pulse 67   Temp 98.3  F (36.8  C) (Oral)   Resp 16   Ht 1.854 m (6' 1\")   Wt 117.5 kg (259 lb)   SpO2 99%   BMI 34.17 kg/m    Body mass index is 34.17 kg/m .  Physical Exam  Constitutional:       Appearance: He is not ill-appearing.   Cardiovascular:      Rate and Rhythm: Normal rate and regular rhythm.   Pulmonary:      Effort: Pulmonary effort is normal.      Breath sounds: Normal breath sounds.                        "

## 2022-03-15 NOTE — PATIENT INSTRUCTIONS
"Patient Education     Heart Palpitations    Palpitations are the feeling that your heart is beating hard, fast, or irregular. Some describe it as \"pounding,\" \"flip-flopping in the chest,\" or \"skipped beats.\" Palpitations may occur in someone with heart disease. But they can also occur in a healthy person.  Heart-related causes:    Heart rhythm problem (arrhythmia)    Heart valve disease    Disease of the heart muscle (cardiomyopathy)    Coronary artery disease    High blood pressure  Non-heart-related causes:    Certain medicines such as asthma inhalers and decongestants    Some herbal supplements, energy drinks and pills, and weight loss pills    Illegal stimulant drugs such as cocaine, crank, methamphetamine, PCP, bath salts, and ecstasy    Caffeine, alcohol, and tobacco    Health conditions such as thyroid disease, anemia, anxiety, and panic disorder  Sometimes the cause can't be found.  Home care  Follow these home care tips:    Don't use too much caffeine, alcohol, or tobacco, or any stimulant drugs.    Tell your doctor about any prescription or over-the-counter or herbal medicines you take.  Follow-up care    Follow up with your doctor, or as advised.    Call 911  This is the fastest and safest way to get to the emergency department. The paramedics can also start treatment on the way to the hospital, if needed.  Don't wait until your symptoms are severe to call 911. These are reasons to call 911:    Chest pain    Shortness of breath    Feeling lightheaded, faint, or dizzy, or losing consciousness    Very irregular heartbeat    Rapid heartbeat that makes you uncomfortable    Slower than usual heart rate along with symptoms    Chest pain with weakness, dizziness, heavy sweating, nausea, or vomiting    Extreme drowsiness, confusion, or weakness    Weakness of an arm or leg, or on one side of the face    Trouble with speech or vision  When to seek medical advice  Call your healthcare provider right away if you " have palpitations that last longer than normal, or are different from your past palpitations.  [employer] last reviewed this educational content on 11/1/2019 2000-2021 [employer]. All rights reserved. This information is not intended as a substitute for professional medical care. Always follow your healthcare professional's instructions.

## 2022-03-21 ENCOUNTER — LAB (OUTPATIENT)
Dept: LAB | Facility: CLINIC | Age: 42
End: 2022-03-21
Payer: COMMERCIAL

## 2022-03-21 DIAGNOSIS — Z11.59 NEED FOR HEPATITIS C SCREENING TEST: ICD-10-CM

## 2022-03-21 DIAGNOSIS — Z11.4 SCREENING FOR HIV (HUMAN IMMUNODEFICIENCY VIRUS): ICD-10-CM

## 2022-03-21 DIAGNOSIS — R00.2 PALPITATIONS: ICD-10-CM

## 2022-03-21 DIAGNOSIS — Z13.220 SCREENING FOR HYPERLIPIDEMIA: ICD-10-CM

## 2022-03-21 LAB — HBA1C MFR BLD: 5.3 % (ref 0–5.6)

## 2022-03-21 PROCEDURE — 80061 LIPID PANEL: CPT

## 2022-03-21 PROCEDURE — 83036 HEMOGLOBIN GLYCOSYLATED A1C: CPT

## 2022-03-21 PROCEDURE — 87389 HIV-1 AG W/HIV-1&-2 AB AG IA: CPT

## 2022-03-21 PROCEDURE — 86803 HEPATITIS C AB TEST: CPT

## 2022-03-21 PROCEDURE — 36415 COLL VENOUS BLD VENIPUNCTURE: CPT

## 2022-03-22 LAB
CHOLEST SERPL-MCNC: 175 MG/DL
FASTING STATUS PATIENT QL REPORTED: YES
HCV AB SERPL QL IA: NONREACTIVE
HDLC SERPL-MCNC: 40 MG/DL
HIV 1+2 AB+HIV1 P24 AG SERPL QL IA: NONREACTIVE
LDLC SERPL CALC-MCNC: 120 MG/DL
NONHDLC SERPL-MCNC: 135 MG/DL
TRIGL SERPL-MCNC: 75 MG/DL

## 2022-04-28 ENCOUNTER — OFFICE VISIT (OUTPATIENT)
Dept: FAMILY MEDICINE | Facility: CLINIC | Age: 42
End: 2022-04-28
Payer: COMMERCIAL

## 2022-04-28 VITALS
HEIGHT: 72 IN | HEART RATE: 72 BPM | DIASTOLIC BLOOD PRESSURE: 84 MMHG | RESPIRATION RATE: 16 BRPM | TEMPERATURE: 97.7 F | WEIGHT: 254.1 LBS | OXYGEN SATURATION: 99 % | BODY MASS INDEX: 34.42 KG/M2 | SYSTOLIC BLOOD PRESSURE: 136 MMHG

## 2022-04-28 DIAGNOSIS — L98.9 SKIN LESION: ICD-10-CM

## 2022-04-28 DIAGNOSIS — Z00.00 ANNUAL PHYSICAL EXAM: Primary | ICD-10-CM

## 2022-04-28 DIAGNOSIS — I47.10 PAROXYSMAL SUPRAVENTRICULAR TACHYCARDIA (H): ICD-10-CM

## 2022-04-28 PROCEDURE — 99396 PREV VISIT EST AGE 40-64: CPT | Performed by: FAMILY MEDICINE

## 2022-04-28 SDOH — HEALTH STABILITY: PHYSICAL HEALTH: ON AVERAGE, HOW MANY MINUTES DO YOU ENGAGE IN EXERCISE AT THIS LEVEL?: 60 MIN

## 2022-04-28 SDOH — ECONOMIC STABILITY: FOOD INSECURITY: WITHIN THE PAST 12 MONTHS, THE FOOD YOU BOUGHT JUST DIDN'T LAST AND YOU DIDN'T HAVE MONEY TO GET MORE.: NEVER TRUE

## 2022-04-28 SDOH — HEALTH STABILITY: PHYSICAL HEALTH: ON AVERAGE, HOW MANY DAYS PER WEEK DO YOU ENGAGE IN MODERATE TO STRENUOUS EXERCISE (LIKE A BRISK WALK)?: 3 DAYS

## 2022-04-28 SDOH — ECONOMIC STABILITY: INCOME INSECURITY: IN THE LAST 12 MONTHS, WAS THERE A TIME WHEN YOU WERE NOT ABLE TO PAY THE MORTGAGE OR RENT ON TIME?: NO

## 2022-04-28 SDOH — ECONOMIC STABILITY: TRANSPORTATION INSECURITY
IN THE PAST 12 MONTHS, HAS THE LACK OF TRANSPORTATION KEPT YOU FROM MEDICAL APPOINTMENTS OR FROM GETTING MEDICATIONS?: NO

## 2022-04-28 SDOH — ECONOMIC STABILITY: FOOD INSECURITY: WITHIN THE PAST 12 MONTHS, YOU WORRIED THAT YOUR FOOD WOULD RUN OUT BEFORE YOU GOT MONEY TO BUY MORE.: NEVER TRUE

## 2022-04-28 SDOH — ECONOMIC STABILITY: TRANSPORTATION INSECURITY
IN THE PAST 12 MONTHS, HAS LACK OF TRANSPORTATION KEPT YOU FROM MEETINGS, WORK, OR FROM GETTING THINGS NEEDED FOR DAILY LIVING?: NO

## 2022-04-28 SDOH — ECONOMIC STABILITY: INCOME INSECURITY: HOW HARD IS IT FOR YOU TO PAY FOR THE VERY BASICS LIKE FOOD, HOUSING, MEDICAL CARE, AND HEATING?: NOT VERY HARD

## 2022-04-28 ASSESSMENT — LIFESTYLE VARIABLES
HOW MANY STANDARD DRINKS CONTAINING ALCOHOL DO YOU HAVE ON A TYPICAL DAY: 3 OR 4
AUDIT-C TOTAL SCORE: 4
HOW OFTEN DO YOU HAVE SIX OR MORE DRINKS ON ONE OCCASION: LESS THAN MONTHLY
SKIP TO QUESTIONS 9-10: 0
HOW OFTEN DO YOU HAVE A DRINK CONTAINING ALCOHOL: 2-4 TIMES A MONTH

## 2022-04-28 ASSESSMENT — SOCIAL DETERMINANTS OF HEALTH (SDOH)
IN A TYPICAL WEEK, HOW MANY TIMES DO YOU TALK ON THE PHONE WITH FAMILY, FRIENDS, OR NEIGHBORS?: TWICE A WEEK
HOW OFTEN DO YOU ATTEND CHURCH OR RELIGIOUS SERVICES?: MORE THAN 4 TIMES PER YEAR
HOW OFTEN DO YOU GET TOGETHER WITH FRIENDS OR RELATIVES?: ONCE A WEEK
DO YOU BELONG TO ANY CLUBS OR ORGANIZATIONS SUCH AS CHURCH GROUPS UNIONS, FRATERNAL OR ATHLETIC GROUPS, OR SCHOOL GROUPS?: YES

## 2022-04-28 NOTE — PROGRESS NOTES
SUBJECTIVE:   CC: Kaleb Benites is an 41 year old male who presents for preventative health visit.   Patient has been advised of split billing requirements and indicates understanding: Yes  Healthy Habits:     Getting at least 3 servings of Calcium per day:  Yes    Bi-annual eye exam:  NO    Dental care twice a year:  NO    Sleep apnea or symptoms of sleep apnea:  None    Diet:  Regular (no restrictions)    Frequency of exercise:  2-3 days/week    Duration of exercise:  45-60 minutes    Taking medications regularly:  Yes    Medication side effects:  Not applicable    PHQ-2 Total Score: 0    Additional concerns today:  No        Today's PHQ-2 Score:   PHQ-2 ( 1999 [employer]) 4/28/2022   Q1: Little interest or pleasure in doing things 0   Q2: Feeling down, depressed or hopeless 0   PHQ-2 Score 0   Q1: Little interest or pleasure in doing things Not at all   Q2: Feeling down, depressed or hopeless Not at all   PHQ-2 Score 0       Abuse: Current or Past(Physical, Sexual or Emotional)- No  Do you feel safe in your environment? Yes    Have you ever done Advance Care Planning? (For example, a Health Directive, POLST, or a discussion with a medical provider or your loved ones about your wishes): No, advance care planning information given to patient to review.  Patient declined advance care planning discussion at this time.    Social History     Tobacco Use     Smoking status: Former Smoker     Types: Cigarettes     Smokeless tobacco: Current User     Types: Chew   Substance Use Topics     Alcohol use: Yes     If you drink alcohol do you typically have >3 drinks per day or >7 drinks per week? Yes      Alcohol Use 4/28/2022   Prescreen: >3 drinks/day or >7 drinks/week? Yes   Prescreen: >3 drinks/day or >7 drinks/week? -   AUDIT SCORE  12     AUDIT - Alcohol Use Disorders Identification Test - Reproduced from the World Health Organization Audit 2001 (Second Edition) 4/28/2022   1.  How often do you have a drink  containing alcohol? 2 to 4 times a month   2.  How many drinks containing alcohol do you have on a typical day when you are drinking? 3 or 4   3.  How often do you have five or more drinks on one occasion? Less than monthly   4.  How often during the last year have you found that you were not able to stop drinking once you had started? Less than monthly   5.  How often during the last year have you failed to do what was normally expected of you because of drinking? Less than monthly   6.  How often during the last year have you needed a first drink in the morning to get yourself going after a heavy drinking session? Never   7.  How often during the last year have you had a feeling of guilt or remorse after drinking? Less than monthly   8.  How often during the last year have you been unable to remember what happened the night before because of your drinking? Less than monthly   9.  Have you or someone else been injured because of your drinking? No   10. Has a relative, friend, doctor or other health care worker been concerned about your drinking or suggested you cut down? Yes, during the last year   TOTAL SCORE 12       Last PSA: No results found for: PSA    Reviewed orders with patient. Reviewed health maintenance and updated orders accordingly - Yes  Labs reviewed in EPIC    Reviewed and updated as needed this visit by clinical staff   Tobacco  Allergies    Med Hx  Surg Hx  Fam Hx  Soc Hx          Reviewed and updated as needed this visit by Provider                   Past Medical History:   Diagnosis Date     Gout     right ankle     Tinea versicolor         Review of Systems  CONSTITUTIONAL: NEGATIVE for fever, chills, change in weight  INTEGUMENTARY/SKIN: NEGATIVE for worrisome rashes, moles or lesions  EYES: NEGATIVE for vision changes or irritation  ENT: NEGATIVE for ear, mouth and throat problems  RESP: NEGATIVE for significant cough or SOB  CV: NEGATIVE for chest pain, palpitations or peripheral  edema  GI: NEGATIVE for nausea, abdominal pain, heartburn, or change in bowel habits   male: negative for dysuria, hematuria, decreased urinary stream, erectile dysfunction, urethral discharge  MUSCULOSKELETAL: NEGATIVE for significant arthralgias or myalgia  NEURO: NEGATIVE for weakness, dizziness or paresthesias  PSYCHIATRIC: NEGATIVE for changes in mood or affect    OBJECTIVE:   /84   Pulse 72   Temp 97.7  F (36.5  C) (Tympanic)   Resp 16   Ht 1.829 m (6')   Wt 115.3 kg (254 lb 1.6 oz)   SpO2 99%   BMI 34.46 kg/m      Physical Exam  Vitals reviewed.   Constitutional:       General: He is not in acute distress.     Appearance: Normal appearance. He is not ill-appearing.   HENT:      Head: Normocephalic and atraumatic.      Right Ear: External ear normal.      Left Ear: External ear normal.      Nose: Nose normal.      Mouth/Throat:      Mouth: Mucous membranes are moist.      Pharynx: Oropharynx is clear.   Eyes:      General: No scleral icterus.        Right eye: No discharge.         Left eye: No discharge.      Extraocular Movements: Extraocular movements intact.      Pupils: Pupils are equal, round, and reactive to light.   Neck:      Vascular: No JVD.   Cardiovascular:      Rate and Rhythm: Normal rate and regular rhythm.   Pulmonary:      Effort: Pulmonary effort is normal. No respiratory distress.      Breath sounds: Normal breath sounds.   Abdominal:      General: Abdomen is flat. Bowel sounds are normal.      Palpations: Abdomen is soft.   Genitourinary:     Penis: Normal.       Comments: 1 x 0.5 inch exophytic lesion  Musculoskeletal:         General: No swelling.      Cervical back: Normal range of motion.   Lymphadenopathy:      Cervical: No cervical adenopathy.   Skin:     General: Skin is warm.      Capillary Refill: Capillary refill takes less than 2 seconds.          Neurological:      General: No focal deficit present.      Mental Status: He is alert.   Psychiatric:         Mood and  Affect: Mood normal.         Behavior: Behavior normal.         Diagnostic Test Results:  Labs reviewed in Epic    ASSESSMENT/PLAN:   Kaleb was seen today for physical.    Diagnoses and all orders for this visit:    Annual physical exam    Skin lesion  Looks to have been treated by dermatology 8 years ago, now growing back, recommend follow-up with Derm  -     Adult Dermatology Referral; Future    Paroxysmal supraventricular tachycardia (H)  Asymptomatic, reviewed previous Zio patch that was ordered in the ER which had 9 beats of SVT.  Likely precipitated by caffeine, recommend decrease caffeine use.  Has not had an episode since ER visit        COUNSELING:   Reviewed preventive health counseling, as reflected in patient instructions    Estimated body mass index is 34.46 kg/m  as calculated from the following:    Height as of this encounter: 1.829 m (6').    Weight as of this encounter: 115.3 kg (254 lb 1.6 oz).     Weight management plan: Discussed healthy diet and exercise guidelines    He reports that he has quit smoking. His smoking use included cigarettes. His smokeless tobacco use includes chew.      Counseling Resources:  ATP IV Guidelines  Pooled Cohorts Equation Calculator  FRAX Risk Assessment  ICSI Preventive Guidelines  Dietary Guidelines for Americans, 2010  USDA's MyPlate  ASA Prophylaxis  Lung CA Screening    Trevor Chino MD  [facility]

## 2022-11-21 ENCOUNTER — HEALTH MAINTENANCE LETTER (OUTPATIENT)
Age: 42
End: 2022-11-21

## 2023-06-02 ENCOUNTER — HEALTH MAINTENANCE LETTER (OUTPATIENT)
Age: 43
End: 2023-06-02

## 2024-06-23 ENCOUNTER — HEALTH MAINTENANCE LETTER (OUTPATIENT)
Age: 44
End: 2024-06-23

## 2024-10-25 NOTE — NURSING NOTE
"Chief Complaint   Patient presents with     Musculoskeletal Problem     right ankle pain    has been seen several times for right ankle pain , \" Not broken \" but continues with pain     Initial /78  Pulse 87  Temp 98.2  F (36.8  C) (Oral)  Ht 6' 1\" (1.854 m)  Wt 259 lb (117.5 kg)  SpO2 97%  BMI 34.17 kg/m2 Estimated body mass index is 34.17 kg/(m^2) as calculated from the following:    Height as of this encounter: 6' 1\" (1.854 m).    Weight as of this encounter: 259 lb (117.5 kg).  Medication Reconciliation: complete    " 3 = A little assistance